# Patient Record
Sex: MALE | Race: BLACK OR AFRICAN AMERICAN | Employment: FULL TIME | URBAN - METROPOLITAN AREA
[De-identification: names, ages, dates, MRNs, and addresses within clinical notes are randomized per-mention and may not be internally consistent; named-entity substitution may affect disease eponyms.]

---

## 2021-10-25 ENCOUNTER — TELEPHONE (OUTPATIENT)
Dept: PHARMACY | Age: 61
End: 2021-10-25

## 2021-10-25 NOTE — TELEPHONE ENCOUNTER
Pt would like to get established w/ the [facility], was going to  CC. Will fax referral to Dr Soren Graff .

## 2021-10-26 NOTE — TELEPHONE ENCOUNTER
Received signed referral from Dr Barbara Marroquin. Tried calling pt to schedule initial appt , had to M.

## 2021-10-29 NOTE — TELEPHONE ENCOUNTER
Patient returned call. States that he has been on warfarin for ~1 year and was managed previously at [facility]. He believes last INR check was ~2 months ago. Scheduled initial appt in clinic for 11/1 at 7:30 am. Asked for patient to arrive 15 min early to register. Asked for pt to bring insurance, ID and med list to appt.

## 2021-11-01 ENCOUNTER — ANTI-COAG VISIT (OUTPATIENT)
Dept: PHARMACY | Age: 61
End: 2021-11-01
Payer: COMMERCIAL

## 2021-11-01 DIAGNOSIS — I63.9 ACUTE CEREBROVASCULAR ACCIDENT (HCC): Primary | ICD-10-CM

## 2021-11-01 LAB — INTERNATIONAL NORMALIZATION RATIO, POC: 1.8

## 2021-11-01 PROCEDURE — 99213 OFFICE O/P EST LOW 20 MIN: CPT

## 2021-11-01 PROCEDURE — 85610 PROTHROMBIN TIME: CPT

## 2021-11-01 RX ORDER — WARFARIN SODIUM 5 MG/1
5 TABLET ORAL DAILY
COMMUNITY
Start: 2021-10-19

## 2021-11-01 RX ORDER — SACUBITRIL AND VALSARTAN 24; 26 MG/1; MG/1
1 TABLET, FILM COATED ORAL
COMMUNITY
Start: 2021-08-19

## 2021-11-01 RX ORDER — ATORVASTATIN CALCIUM 80 MG/1
80 TABLET, FILM COATED ORAL DAILY
COMMUNITY
Start: 2020-12-17

## 2021-11-01 RX ORDER — WARFARIN SODIUM 2.5 MG/1
2.5 TABLET ORAL
COMMUNITY
Start: 2021-09-12

## 2021-11-01 RX ORDER — METOPROLOL SUCCINATE 25 MG/1
25 TABLET, EXTENDED RELEASE ORAL DAILY
COMMUNITY
Start: 2021-01-28

## 2021-11-01 RX ORDER — PREDNISONE 1 MG/1
10 TABLET ORAL DAILY
COMMUNITY
Start: 2021-03-01

## 2021-11-01 NOTE — PROGRESS NOTES
Mr. Kelsi Wallace is a 62 y.o. y/o male with history of CVA dt thrombus who presents today for anticoagulation monitoring and adjustment. 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 Was monitored at [facility] but dc dt non-compliance. Patient Reported Findings:  Yes     No  [x]   []       Patient verifies current dosing regimen as listed- pt has 5mg and 2.5mg tablets and takes 7.5mg (one of each) on Thursdays only and 5mg all other days  []   [x]       S/S bleeding/bruising/swelling/SOB  []   [x]       Blood in urine or stool  []   [x]       Procedures scheduled in the future at this time- had wisdom teeth procedure on 10/27 and held 2 days before and day of procedure, then took 7.5mg on Thurs and Sat and today he thinks   []   [x]       Missed Dose- held 2 days before procedure and day of  []   [x]       Extra Dose- took 7.5mg Sat and today   []   [x]       Change in medications- takes pred 10mg daily, taking ibuprofen since wisdom teeth removed, pt not sure of meds- will print off list and he will check and bring back at next visit. []   [x]       Change in health/diet/appetite- pt cut them out of diet but occasionally eats broccoli   []   [x]       Change in alcohol use- drinks wine occasionally, doesn't smoke   []   [x]       Change in activity  []   [x]       Hospital admission  []   [x]       Emergency department visit  []   [x]       Other complaints    Clinical Outcomes:  Yes     No  []   [x]       Major bleeding event  []   [x]       Thromboembolic event    Duration of warfarin Therapy: indefinite  INR Range:  2.0-3.0    Re-educated patient of signs and symptoms of bleeding and clotting, when to seek emergent care, the need to maintain a consistent diet and notification of clinic for any new medications including over the counter medications or abnormal bleeding.  Patient acknowledges working in consult agreement with pharmacist as referred by his/her physician. Pt has 5mg and 2.5mg tablets, takes at night. Pt states normally takes 7.5mg (one of each tablets) on Thursdays only and 5mg all other days but had wisdom teeth removed Wed and held 2 days before and day of then boosted Sat and was going to boost today as well. Per last  CC note pt was taking 7.5mg on Tues and Thurs and INR was 3.3 so dropped to 7.5mg on Thurs only (this was 8/10/21). INR is 1.8 today  Take 7.5mg tonight then continue taking dose of 7.5mg Thursdays only and 5mg all other days. Encouraged to maintain a consistency of vegetables/salads.    Recheck INR in 1 week, on 11/9  Pt would prefer 3:30/later apts dt work schedule     **consent form signed 11/1/2021    Referring Dr. Kristine Segovia  INR (no units)   Date Value   02/02/2021 3.6 (H)   01/27/2021 2.7 (H)   01/22/2021 1.7 (H)   01/19/2021 1.2       For Pharmacy Admin Tracking Only     Intervention Detail: Dose Adjustment: 1, reason: Therapy Optimization   Total # of Interventions Recommended: 1   Total # of Interventions Accepted: 1   Time Spent (min): 45

## 2021-11-09 ENCOUNTER — ANTI-COAG VISIT (OUTPATIENT)
Dept: PHARMACY | Age: 61
End: 2021-11-09
Payer: COMMERCIAL

## 2021-11-09 DIAGNOSIS — I63.9 ACUTE CEREBROVASCULAR ACCIDENT (HCC): Primary | ICD-10-CM

## 2021-11-09 LAB — INTERNATIONAL NORMALIZATION RATIO, POC: 1.8

## 2021-11-09 PROCEDURE — 99211 OFF/OP EST MAY X REQ PHY/QHP: CPT

## 2021-11-09 PROCEDURE — 85610 PROTHROMBIN TIME: CPT

## 2021-11-09 NOTE — PROGRESS NOTES
Mr. Omari Palacios is a 62 y.o. y/o male with history of CVA dt thrombus who presents today for anticoagulation monitoring and adjustment. 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 Was monitored at [facility] but dc dt non-compliance. Patient Reported Findings:  Yes     No  [x]   []       Patient verifies current dosing regimen as listed- pt has 5mg and 2.5mg tablets and takes 7.5mg (one of each) on Thursdays only and 5mg all other days --> verified dose   []   [x]       S/S bleeding/bruising/swelling/SOB  []   [x]       Blood in urine or stool  []   [x]       Procedures scheduled in the future at this time- had wisdom teeth procedure on 10/27 and held 2 days before and day of procedure, then took 7.5mg on Thurs and Sat and today he thinks   [x]   []       Missed Dose- Sunday   []   [x]       Extra Dose-    [x]   []       Change in medications- takes pred 10mg daily, taking ibuprofen since wisdom teeth removed, pt not sure of meds- will print off list and he will check and bring back at next visit. -->  Restarted metoprlol, had not been taking   [x]   []       Change in health/diet/appetite- pt cut them out of diet but occasionally eats broccoli --> had more green tea than normal   []   [x]       Change in alcohol use- drinks wine occasionally, doesn't smoke   []   [x]       Change in activity  []   [x]       Hospital admission  []   [x]       Emergency department visit  []   [x]       Other complaints    Clinical Outcomes:  Yes     No  []   [x]       Major bleeding event  []   [x]       Thromboembolic event    Duration of warfarin Therapy: indefinite  INR Range:  2.0-3.0    Pt has 5mg and 2.5mg tablets, takes at night. Pt states normally takes 7.5mg (one of each tablets) on Thursdays only and 5mg all other days but had wisdom teeth removed Wed and held 2 days before and day of then boosted Sat and was going to boost today as well.  Per last  CC note pt was taking 7.5mg on Tues and Thurs and INR was 3.3 so dropped to 7.5mg on Thurs only (this was 8/10/21). INR is 1.8 again today  Take 7.5mg tonight then continue taking dose of 7.5mg Thursdays only and 5mg all other days. Encouraged to maintain a consistency of vegetables/salads.    Recheck INR in 1 week, 11/16    Pt would prefer 3:30/later apts dt work schedule     **consent form signed 11/1/2021    Referring Dr. Suleman Neves  INR (no units)   Date Value   11/01/2021 1.8   02/02/2021 3.6 (H)   01/27/2021 2.7 (H)   01/22/2021 1.7 (H)   01/19/2021 1.2       For Pharmacy Admin Tracking Only     Intervention Detail: Dose Adjustment: 1, reason: Therapy Optimization   Total # of Interventions Recommended: 1   Total # of Interventions Accepted: 1   Time Spent (min): 15

## 2021-11-12 RX ORDER — DEXTROSE, SODIUM CHLORIDE, AND POTASSIUM CHLORIDE 5; .45; .15 G/100ML; G/100ML; G/100ML
INJECTION INTRAVENOUS
Status: DISPENSED
Start: 2021-11-12 | End: 2021-11-12

## 2021-11-16 ENCOUNTER — ANTI-COAG VISIT (OUTPATIENT)
Dept: PHARMACY | Age: 61
End: 2021-11-16
Payer: COMMERCIAL

## 2021-11-16 DIAGNOSIS — I63.9 ACUTE CEREBROVASCULAR ACCIDENT (HCC): Primary | ICD-10-CM

## 2021-11-16 LAB — INTERNATIONAL NORMALIZATION RATIO, POC: 1.7

## 2021-11-16 PROCEDURE — 85610 PROTHROMBIN TIME: CPT

## 2021-11-16 PROCEDURE — 99212 OFFICE O/P EST SF 10 MIN: CPT

## 2021-11-16 NOTE — PROGRESS NOTES
Mr. Miracle Galvan is a 62 y.o. y/o male with history of CVA dt thrombus who presents today for anticoagulation monitoring and adjustment. 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 Was monitored at [facility] but dc dt non-compliance. Patient Reported Findings:  Yes     No  [x]   []       Patient verifies current dosing regimen as listed- pt has 5mg and 2.5mg tablets and takes 7.5mg (one of each) on Thursdays only and 5mg all other days --> verified dose   []   [x]       S/S bleeding/bruising/swelling/SOB- denies  []   [x]       Blood in urine or stool- denies   []   [x]       Procedures scheduled in the future at this time- had wisdom teeth procedure on 10/27 and held 2 days before and day of procedure, then took 7.5mg on Thurs and Sat and today he thinks---> denies    []   [x]       Missed Dose- missed Sun and Mon  []   [x]       Extra Dose-    [x]   []       Change in medications- takes pred 10mg daily, taking ibuprofen since wisdom teeth removed, pt not sure of meds- will print off list and he will check and bring back at next visit. -->  Restarted metoprolol, had not been taking---> no changes    [x]   []       Change in health/diet/appetite- pt cut them out of diet but occasionally eats broccoli --> had more green tea than normal---> no changes, no NVD   []   [x]       Change in alcohol use- drinks wine occasionally, doesn't smoke   []   [x]       Change in activity  []   [x]       Hospital admission  []   [x]       Emergency department visit  []   [x]       Other complaints    Clinical Outcomes:  Yes     No  []   [x]       Major bleeding event  []   [x]       Thromboembolic event    Duration of warfarin Therapy: indefinite  INR Range:  2.0-3.0    Pt has 5mg and 2.5mg tablets, takes at night. Per last  CC note pt was taking 7.5mg on Tues and Thurs and INR was 3.3 so dropped to 7.5mg on Thurs only (this was 8/10/21).      INR is 1.7 today after missing the past 2 days. Explained need to use pillbox/AVS/alarm to remember taking dose as he consistently misses recently. Take 7.5mg tonight then continue taking dose of 7.5mg  only and 5mg all other days   Encouraged to maintain a consistency of vegetables/salads. RF of 5mg called into [facility].   Recheck INR in 1 week,     Pt would prefer 3:30/later apts dt work schedule   **consent form signed 2021    Referring Dr. Terell Ching  INR (no units)   Date Value   2021 1.7   2021 1.8   2021 1.8   2021 3.6 (H)   2021 2.7 (H)   2021 1.7 (H)   2021 1.2       For Pharmacy Admin Tracking Only     Intervention Detail: Adherence Monitorin, Dose Adjustment: 1, reason: Therapy Optimization and Refill(s) Provided   Total # of Interventions Recommended: 3   Total # of Interventions Accepted: 3   Time Spent (min): 15

## 2021-11-16 NOTE — TELEPHONE ENCOUNTER
Warfarin prescription phoned into [facility] [address] to [address] under [address]. Merlene Felder ([phone])  Warfarin 5 mg tabs  Take 5mg daily   90 days   2 refills    Martin Levy, JoonD, [facility]    **of note pt also has 2.5mg warfarin tables and total dose is: 7.5 mg (5 mg x 1 and 2.5 mg x 1) every Thu; 5 mg (5 mg x 1) all other days.

## 2021-11-23 ENCOUNTER — ANTI-COAG VISIT (OUTPATIENT)
Dept: PHARMACY | Age: 61
End: 2021-11-23
Payer: COMMERCIAL

## 2021-11-23 DIAGNOSIS — I63.9 ACUTE CEREBROVASCULAR ACCIDENT (HCC): Primary | ICD-10-CM

## 2021-11-23 LAB — INTERNATIONAL NORMALIZATION RATIO, POC: 1.8

## 2021-11-23 PROCEDURE — 99212 OFFICE O/P EST SF 10 MIN: CPT

## 2021-11-23 PROCEDURE — 85610 PROTHROMBIN TIME: CPT

## 2021-11-23 NOTE — PROGRESS NOTES
Mr. Radha Nieto is a 62 y.o. y/o male with history of CVA dt thrombus who presents today for anticoagulation monitoring and adjustment. 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 Was monitored at [facility] but dc dt non-compliance. Patient Reported Findings:  Yes     No  [x]   []       Patient verifies current dosing regimen as listed- pt has 5mg and 2.5mg tablets and takes 7.5mg (one of each) on Thursdays only and 5mg all other days --> verified dose   []   [x]       S/S bleeding/bruising/swelling/SOB- denies  []   [x]       Blood in urine or stool- denies   []   [x]       Procedures scheduled in the future at this time- had wisdom teeth procedure on 10/27 and held 2 days before and day of procedure, then took 7.5mg on Thurs and Sat and today he thinks---> denies    []   [x]       Missed Dose- denies   []   [x]       Extra Dose-    [x]   []       Change in medications- takes pred 10mg daily, taking ibuprofen since wisdom teeth removed, pt not sure of meds- will print off list and he will check and bring back at next visit. -->  Restarted metoprolol, had not been taking---> increasing entresto     []   [x]       Change in health/diet/appetite- pt cut them out of diet but occasionally eats broccoli --> had more green tea than normal---> no changes, no NVD, no vit k    []   [x]       Change in alcohol use- drinks wine occasionally, doesn't smoke   []   [x]       Change in activity  []   [x]       Hospital admission  []   [x]       Emergency department visit  []   [x]       Other complaints    Clinical Outcomes:  Yes     No  []   [x]       Major bleeding event  []   [x]       Thromboembolic event    Duration of warfarin Therapy: indefinite  INR Range:  2.0-3.0    Pt has 5mg and 2.5mg tablets, takes at night. Per last  CC note pt was taking 7.5mg on Tues and Thurs and INR was 3.3 so dropped to 7.5mg on Thurs only (this was 8/10/21).      INR is 1.8

## 2021-12-03 ENCOUNTER — ANTI-COAG VISIT (OUTPATIENT)
Dept: PHARMACY | Age: 61
End: 2021-12-03
Payer: COMMERCIAL

## 2021-12-03 DIAGNOSIS — I63.9 ACUTE CEREBROVASCULAR ACCIDENT (HCC): Primary | ICD-10-CM

## 2021-12-03 LAB — INTERNATIONAL NORMALIZATION RATIO, POC: 2.6

## 2021-12-03 PROCEDURE — 85610 PROTHROMBIN TIME: CPT

## 2021-12-03 PROCEDURE — 99211 OFF/OP EST MAY X REQ PHY/QHP: CPT

## 2021-12-03 NOTE — PROGRESS NOTES
Mr. Lorraine Dean is a 62 y.o. y/o male with history of CVA dt thrombus who presents today for anticoagulation monitoring and adjustment. 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 Was monitored at [facility] but dc dt non-compliance. Patient Reported Findings:   Yes     No  [x]   []       Patient verifies current dosing regimen as listed- pt has 5mg and 2.5mg tablets and takes 7.5mg (one of each) on Thursdays only and 5mg all other days --> verified dose   []   [x]       S/S bleeding/bruising/swelling/SOB- denies  []   [x]       Blood in urine or stool- denies   []   [x]       Procedures scheduled in the future at this time- had wisdom teeth procedure on 10/27 and held 2 days before and day of procedure, then took 7.5mg on Thurs and Sat and today he thinks---> denies     []   [x]       Missed Dose- denies --> missed 2.5 mg on last Tuesday (should be 7.5 mg but only took 5 mg) and took extra 2.5 mg the next day instead  []   [x]       Extra Dose  [x]   []       Change in medications- takes pred 10mg daily, taking ibuprofen since wisdom teeth removed, pt not sure of meds- will print off list and he will check and bring back at next visit.  -->  Restarted metoprolol, had not been taking---> increasing entresto --> MD inc med dose but unsure of name; asked pt to bring the med to next appt   []   [x]       Change in health/diet/appetite- pt cut them out of diet but occasionally eats broccoli --> had more green tea than normal---> no changes, no NVD, no vit k --> eating some string beans but no greens otherwise   []   [x]       Change in alcohol use- drinks wine occasionally, doesn't smoke   []   [x]       Change in activity  []   [x]       Hospital admission  []   [x]       Emergency department visit  []   [x]       Other complaints    Clinical Outcomes:  Yes     No  []   [x]       Major bleeding event  []   [x]       Thromboembolic event    Duration of warfarin Therapy: indefinite  INR Range:  2.0-3.0    Pt has 5mg and 2.5mg tablets, takes at night. Per last [facility] note pt was taking 7.5mg on Tues and Thurs and INR was 3.3 so dropped to 7.5mg on Thurs only (this was 8/10/21). INR is 2.6 Today after the dose increase last visit  Continue weekly dose to 7.5mg Tues, Thurs and Sat and 5mg all other days   Encouraged to maintain a consistency of vegetables/salads.    Recheck INR in 3 week, 12/23 d/t holiday hours     Pt would prefer 3:30/later apts dt work schedule   **consent form signed 11/1/2021    Referring Dr. Devon Alston  INR (no units)   Date Value   11/23/2021 1.8   11/16/2021 1.7   11/09/2021 1.8   11/01/2021 1.8   02/02/2021 3.6 (H)   01/27/2021 2.7 (H)   01/22/2021 1.7 (H)   01/19/2021 1.2       For Pharmacy Admin Tracking Only     Total # of Interventions Recommended: 0   Total # of Interventions Accepted: 0   Time Spent (min): 15

## 2021-12-23 ENCOUNTER — ANTI-COAG VISIT (OUTPATIENT)
Dept: PHARMACY | Age: 61
End: 2021-12-23
Payer: COMMERCIAL

## 2021-12-23 DIAGNOSIS — I63.9 ACUTE CEREBROVASCULAR ACCIDENT (HCC): Primary | ICD-10-CM

## 2021-12-23 LAB — INTERNATIONAL NORMALIZATION RATIO, POC: 2.6

## 2021-12-23 PROCEDURE — 99211 OFF/OP EST MAY X REQ PHY/QHP: CPT

## 2021-12-23 PROCEDURE — 85610 PROTHROMBIN TIME: CPT

## 2021-12-23 RX ORDER — SPIRONOLACTONE 25 MG/1
12.5 TABLET ORAL EVERY OTHER DAY
COMMUNITY

## 2021-12-23 NOTE — PROGRESS NOTES
Mr. Miracle Galvan is a 62 y.o. y/o male with history of CVA dt thrombus who presents today for anticoagulation monitoring and adjustment. 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 Was monitored at [facility] but dc dt non-compliance. Patient Reported Findings:   Yes     No  [x]   []       Patient verifies current dosing regimen as listed- pt has 5mg and 2.5mg tablets and takes 7.5mg (one of each) on Thursdays only and 5mg all other days --> verified dose   []   [x]       S/S bleeding/bruising/swelling/SOB- denies  []   [x]       Blood in urine or stool- denies   []   [x]       Procedures scheduled in the future at this time- had wisdom teeth procedure on 10/27 and held 2 days before and day of procedure, then took 7.5mg on Thurs and Sat and today he thinks---> denies     []   [x]       Missed Dose - Missed 12/22  []   [x]       Extra Dose - Denies  [x]   []       Change in medications- takes pred 10mg daily, taking ibuprofen since wisdom teeth removed, pt not sure of meds- will print off list and he will check and bring back at next visit.  -->  Restarted metoprolol, had not been taking---> increasing entresto --> MD inc med dose but unsure of name; asked pt to bring the med to next appt  --> spironolactone 12.5mg EOD  []   [x]       Change in health/diet/appetite- pt cut them out of diet but occasionally eats broccoli --> had more green tea than normal---> no changes, no NVD, no vit k --> eating some string beans but no greens otherwise --> no changes, no NVD   []   [x]       Change in alcohol use- drinks wine occasionally, doesn't smoke   []   [x]       Change in activity  []   [x]       Hospital admission  []   [x]       Emergency department visit  []   [x]       Other complaints    Clinical Outcomes:  Yes     No  []   [x]       Major bleeding event  []   [x]       Thromboembolic event    Duration of warfarin Therapy: indefinite  INR Range: 2.0-3.0    Pt has 5mg and 2.5mg tablets, takes at night. Per last [facility] note pt was taking 7.5mg on Tues and Thurs and INR was 3.3 so dropped to 7.5mg on Thurs only (this was 8/10/21). INR is 2.6  Continue weekly dose to 7.5mg Tues, Thurs and Sat and 5mg all other days   Encouraged to maintain a consistency of vegetables/salads.    Recheck INR in 4 week, 1/20     Pt would prefer 3:30/later apts dt work schedule   **consent form signed 11/1/2021    Referring Dr. Bonnie Tovar  INR (no units)   Date Value   12/23/2021 2.6   12/03/2021 2.6   11/23/2021 1.8   11/16/2021 1.7   02/02/2021 3.6 (H)   01/27/2021 2.7 (H)   01/22/2021 1.7 (H)   01/19/2021 1.2     For Pharmacy Admin Tracking Only     Total # of Interventions Recommended: 0   Total # of Interventions Accepted: 0   Time Spent (min): 15

## 2022-01-20 ENCOUNTER — ANTI-COAG VISIT (OUTPATIENT)
Dept: PHARMACY | Age: 62
End: 2022-01-20
Payer: COMMERCIAL

## 2022-01-20 DIAGNOSIS — I63.9 ACUTE CEREBROVASCULAR ACCIDENT (HCC): Primary | ICD-10-CM

## 2022-01-20 LAB — INTERNATIONAL NORMALIZATION RATIO, POC: 2.8

## 2022-01-20 PROCEDURE — 85610 PROTHROMBIN TIME: CPT

## 2022-01-20 PROCEDURE — 99212 OFFICE O/P EST SF 10 MIN: CPT

## 2022-01-20 NOTE — PROGRESS NOTES
Mr. Isak Lynn is a 62 y.o. y/o male with history of CVA dt thrombus who presents today for anticoagulation monitoring and adjustment. 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 Was monitored at [facility] but dc dt non-compliance. Patient Reported Findings:   Yes     No  [x]   []       Patient verifies current dosing regimen as listed- pt has 5mg and 2.5mg tablets and takes 7.5mg (one of each) on Thursdays only and 5mg all other days --> verified dose   []   [x]       S/S bleeding/bruising/swelling/SOB- denies  []   [x]       Blood in urine or stool- denies   []   [x]       Procedures scheduled in the future at this time- had wisdom teeth procedure on 10/27 and held 2 days before and day of procedure, then took 7.5mg on Thurs and Sat and today he thinks---> denies     [x]   []       Missed Dose - Missed last night and 2 nights ago   []   [x]       Extra Dose - Denies  [x]   []       Change in medications- takes pred 10mg daily, taking ibuprofen since wisdom teeth removed, pt not sure of meds- will print off list and he will check and bring back at next visit. -->  Restarted metoprolol, had not been taking---> increasing entresto --> MD inc med dose but unsure of name; asked pt to bring the med to next appt  --> spironolactone 12.5mg EOD --> had injection of steroid for sarcoidosis, will be having injections twice a week of steroids will be on for likely 1 year    [x]   []       Change in health/diet/appetite- pt cut them out of diet but occasionally eats broccoli --> had more green tea than normal---> no changes, no NVD, no vit k --> eating some string beans but no greens otherwise --> no changes, no NVD --> had been having diarrhea for 2 weeks, states that will happen on and off. Has resolved in the past week.  No changes in diet, still having small amount of green tea    []   [x]       Change in alcohol use- drinks wine occasionally, doesn't smoke []   [x]       Change in activity  []   [x]       Hospital admission  []   [x]       Emergency department visit  []   [x]       Other complaints    Clinical Outcomes:  Yes     No  []   [x]       Major bleeding event  []   [x]       Thromboembolic event    Duration of warfarin Therapy: indefinite  INR Range:  2.0-3.0    Pt has 5mg and 2.5mg tablets, takes at night. Per last [facility] note pt was taking 7.5mg on Tues and Thurs and INR was 3.3 so dropped to 7.5mg on Thurs only (this was 8/10/21). INR is 2.8 today after missing doses past 2 days, having diarrhea, but starting steroid injections    Decrease weekly dose to 5 mg daily (17% dec)  Encouraged to maintain a consistency of vegetables/salads.    Recheck INR in 1 week, 1/28     Pt would prefer 3:30/later apts dt work schedule   **consent form signed 11/1/2021    Referring Dr. Jean Logan  INR (no units)   Date Value   12/23/2021 2.6   12/03/2021 2.6   11/23/2021 1.8   11/16/2021 1.7   02/02/2021 3.6 (H)   01/27/2021 2.7 (H)   01/22/2021 1.7 (H)   01/19/2021 1.2       For Pharmacy Admin Tracking Only     Intervention Detail: Dose Adjustment: 1, reason: Therapy Optimization   Total # of Interventions Recommended: 1   Total # of Interventions Accepted: 1   Time Spent (min): 15

## 2022-01-28 ENCOUNTER — ANTI-COAG VISIT (OUTPATIENT)
Dept: PHARMACY | Age: 62
End: 2022-01-28
Payer: COMMERCIAL

## 2022-01-28 DIAGNOSIS — I63.9 ACUTE CEREBROVASCULAR ACCIDENT (HCC): Primary | ICD-10-CM

## 2022-01-28 LAB — INTERNATIONAL NORMALIZATION RATIO, POC: 2.7

## 2022-01-28 PROCEDURE — 85610 PROTHROMBIN TIME: CPT

## 2022-01-28 PROCEDURE — 99211 OFF/OP EST MAY X REQ PHY/QHP: CPT

## 2022-01-28 NOTE — PROGRESS NOTES
Mr. Yoly Mcgill is a 62 y.o. y/o male with history of CVA dt thrombus who presents today for anticoagulation monitoring and adjustment. 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 Was monitored at [facility] but dc dt non-compliance. Patient Reported Findings:   Yes     No  [x]   []       Patient verifies current dosing regimen as listed- pt has 5mg and 2.5mg tablets and takes 7.5mg (one of each) on Thursdays only and 5mg all other days --> verified dose   []   [x]       S/S bleeding/bruising/swelling/SOB- denies  []   [x]       Blood in urine or stool- denies   []   [x]       Procedures scheduled in the future at this time- had wisdom teeth procedure on 10/27 and held 2 days before and day of procedure, then took 7.5mg on Thurs and Sat and today he thinks---> denies     [x]   []       Missed Dose - Denies   []   [x]       Extra Dose - Denies  [x]   []       Change in medications- takes pred 10mg daily, taking ibuprofen since wisdom teeth removed, pt not sure of meds- will print off list and he will check and bring back at next visit. -->  Restarted metoprolol, had not been taking---> increasing entresto --> MD inc med dose but unsure of name; asked pt to bring the med to next appt  --> spironolactone 12.5mg EOD --> had injection of steroid for sarcoidosis, will be having injections twice a week of steroids will be on for likely 1 year --> no changes    [x]   []       Change in health/diet/appetite- pt cut them out of diet but occasionally eats broccoli --> had more green tea than normal---> no changes, no NVD, no vit k --> eating some string beans but no greens otherwise --> no changes, no NVD --> had been having diarrhea for 2 weeks, states that will happen on and off. Has resolved in the past week.  No changes in diet, still having small amount of green tea --> wants to add a day of greens in, no NVD  []   [x]       Change in alcohol use- drinks wine occasionally, doesn't smoke   []   [x]       Change in activity  []   [x]       Hospital admission  []   [x]       Emergency department visit  []   [x]       Other complaints    Clinical Outcomes:  Yes     No  []   [x]       Major bleeding event  []   [x]       Thromboembolic event    Duration of warfarin Therapy: indefinite  INR Range:  2.0-3.0    Pt has 5mg and 2.5mg tablets, takes at night. Per last [facility] note pt was taking 7.5mg on Tues and Thurs and INR was 3.3 so dropped to 7.5mg on Thurs only (this was 8/10/21). INR is 2.7 today after dose decrease last week as patient was starting steroid injections. Patient would like to add in a day of greens, okay as patient at the higher end of his range. Encouraged to maintain a consistency of vegetables/salads.    Recheck INR in 2 week, 2/11    Pt would prefer 3:30/later apts dt work schedule   **consent form signed 11/1/2021    Referring Dr. Bernardo Bowling  INR (no units)   Date Value   01/28/2022 2.7   01/20/2022 2.8   12/23/2021 2.6   12/03/2021 2.6   02/02/2021 3.6 (H)   01/27/2021 2.7 (H)   01/22/2021 1.7 (H)   01/19/2021 1.2   For Pharmacy Admin Tracking Only     Total # of Interventions Recommended: 0   Total # of Interventions Accepted: 0   Time Spent (min): 15

## 2022-02-11 ENCOUNTER — ANTI-COAG VISIT (OUTPATIENT)
Dept: PHARMACY | Age: 62
End: 2022-02-11
Payer: COMMERCIAL

## 2022-02-11 DIAGNOSIS — I63.9 ACUTE CEREBROVASCULAR ACCIDENT (HCC): Primary | ICD-10-CM

## 2022-02-11 LAB — INTERNATIONAL NORMALIZATION RATIO, POC: 1.3

## 2022-02-11 PROCEDURE — 85610 PROTHROMBIN TIME: CPT

## 2022-02-11 PROCEDURE — 99211 OFF/OP EST MAY X REQ PHY/QHP: CPT

## 2022-02-11 NOTE — PROGRESS NOTES
Mr. Sania Xiao is a 62 y.o. y/o male with history of CVA dt thrombus who presents today for anticoagulation monitoring and adjustment. 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 Was monitored at [facility] but dc dt non-compliance. Patient Reported Findings:   Yes     No  [x]   []       Patient verifies current dosing regimen as listed- pt has 5mg and 2.5mg tablets and takes 7.5mg (one of each) on Thursdays only and 5mg all other days --> verified dose   []   [x]       S/S bleeding/bruising/swelling/SOB- denies  []   [x]       Blood in urine or stool- denies   []   [x]       Procedures scheduled in the future at this time- had wisdom teeth procedure on 10/27 and held 2 days before and day of procedure, then took 7.5mg on Thurs and Sat and today he thinks---> denies     [x]   []       Missed Dose -  2/7 and 2/9  []   [x]       Extra Dose - Denies  [x]   []       Change in medications- takes pred 10mg daily, taking ibuprofen since wisdom teeth removed, pt not sure of meds- will print off list and he will check and bring back at next visit. -->  Restarted metoprolol, had not been taking---> increasing entresto --> MD inc med dose but unsure of name; asked pt to bring the med to next appt  --> spironolactone 12.5mg EOD --> had injection of steroid for sarcoidosis, will be having injections twice a week of steroids will be on for likely 1 year --> no changes --> 2-3 servings greens (summer greens), no NVD   [x]   []       Change in health/diet/appetite- pt cut them out of diet but occasionally eats broccoli --> had more green tea than normal---> no changes, no NVD, no vit k --> eating some string beans but no greens otherwise --> no changes, no NVD --> had been having diarrhea for 2 weeks, states that will happen on and off. Has resolved in the past week.  No changes in diet, still having small amount of green tea --> wants to add a day of greens in, no NVD  []   [x]       Change in alcohol use- drinks wine occasionally, doesn't smoke   []   [x]       Change in activity  []   [x]       Hospital admission  []   [x]       Emergency department visit  []   [x]       Other complaints    Clinical Outcomes:  Yes     No  []   [x]       Major bleeding event  []   [x]       Thromboembolic event    Duration of warfarin Therapy: indefinite  INR Range:  2.0-3.0    Pt has 5mg and 2.5mg tablets, takes at night. Per last [facility] note pt was taking 7.5mg on Tues and Thurs and INR was 3.3 so dropped to 7.5mg on Thurs only (this was 8/10/21). INR is 1.3 after two missed doses and adding greens in over the last two weeks. Would like to see INR with no missed doses prior to increasing maintenance dose. Take 7.5mg tonight then continue to take 5mg daily   Encouraged to maintain a consistency of vegetables/salads.    Recheck INR in 2 week, 2/25    Pt would prefer 3:30/later apts dt work schedule   **consent form signed 11/1/2021    Referring Dr. Nael Sweet  INR (no units)   Date Value   01/28/2022 2.7   01/20/2022 2.8   12/23/2021 2.6   12/03/2021 2.6   02/02/2021 3.6 (H)   01/27/2021 2.7 (H)   01/22/2021 1.7 (H)   01/19/2021 1.2   For Pharmacy Admin Tracking Only  Intervention Detail: Dose Adjustment: 1, reason: Therapy Optimization  Total # of Interventions Recommended: 1  Total # of Interventions Accepted: 1  Time Spent (min): 15

## 2022-02-25 ENCOUNTER — ANTI-COAG VISIT (OUTPATIENT)
Dept: PHARMACY | Age: 62
End: 2022-02-25
Payer: COMMERCIAL

## 2022-02-25 DIAGNOSIS — I63.9 ACUTE CEREBROVASCULAR ACCIDENT (HCC): Primary | ICD-10-CM

## 2022-02-25 LAB — INTERNATIONAL NORMALIZATION RATIO, POC: 2.2

## 2022-02-25 PROCEDURE — 99211 OFF/OP EST MAY X REQ PHY/QHP: CPT

## 2022-02-25 PROCEDURE — 85610 PROTHROMBIN TIME: CPT

## 2022-02-25 NOTE — PROGRESS NOTES
Mr. Liam Chen is a 62 y.o. y/o male with history of CVA dt thrombus who presents today for anticoagulation monitoring and adjustment. 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 Was monitored at [facility] but dc dt non-compliance. Patient Reported Findings:   Yes     No  [x]   []       Patient verifies current dosing regimen as listed- pt has 5mg and 2.5mg tablets and takes 7.5mg (one of each) on Thursdays only and 5mg all other days --> verified dose   []   [x]       S/S bleeding/bruising/swelling/SOB- denies  []   [x]       Blood in urine or stool- denies   []   [x]       Procedures scheduled in the future at this time- had wisdom teeth procedure on 10/27 and held 2 days before and day of procedure, then took 7.5mg on Thurs and Sat and today he thinks---> denies     [x]   []       Missed Dose -  2/7 and 2/9  []   [x]       Extra Dose - Denies  [x]   []       Change in medications- takes pred 10mg daily, taking ibuprofen since wisdom teeth removed, pt not sure of meds- will print off list and he will check and bring back at next visit. -->  Restarted metoprolol, had not been taking---> increasing entresto --> MD inc med dose but unsure of name; asked pt to bring the med to next appt  --> spironolactone 12.5mg EOD --> had injection of steroid for sarcoidosis, will be having injections twice a week of steroids will be on for likely 1 year --> no changes --> 2-3 servings greens (summer greens), no NVD --> no greens, no NVD  [x]   []       Change in health/diet/appetite- pt cut them out of diet but occasionally eats broccoli --> had more green tea than normal---> no changes, no NVD, no vit k --> eating some string beans but no greens otherwise --> no changes, no NVD --> had been having diarrhea for 2 weeks, states that will happen on and off. Has resolved in the past week.  No changes in diet, still having small amount of green tea --> wants to add a day of greens in, no NVD  []   [x]       Change in alcohol use- drinks wine occasionally, doesn't smoke   []   [x]       Change in activity  []   [x]       Hospital admission  []   [x]       Emergency department visit  []   [x]       Other complaints    Clinical Outcomes:  Yes     No  []   [x]       Major bleeding event  []   [x]       Thromboembolic event    Duration of warfarin Therapy: indefinite  INR Range:  2.0-3.0    Pt has 5mg and 2.5mg tablets, takes at night. Per last [facility] note pt was taking 7.5mg on Tues and Thurs and INR was 3.3 so dropped to 7.5mg on Thurs only (this was 8/10/21). INR is 2.2 after no missed doses / no greens. Patient plans to continue with no greens for now. Will maintain current dose. Continue to take 5mg daily    Encouraged to maintain a consistency of vegetables/salads.    Recheck INR in 3 week, 3/18    Pt would prefer 3:30/later apts dt work schedule   **consent form signed 11/1/2021    Referring Dr. Moe Xiao  INR (no units)   Date Value   02/11/2022 1.3   01/28/2022 2.7   01/20/2022 2.8   12/23/2021 2.6   02/02/2021 3.6 (H)   01/27/2021 2.7 (H)   01/22/2021 1.7 (H)   01/19/2021 1.2   For Pharmacy Admin Tracking Only  Intervention Detail: Refill(s) Provided  Total # of Interventions Recommended: 1  Total # of Interventions Accepted: 1  Time Spent (min): 15

## 2022-03-18 ENCOUNTER — ANTI-COAG VISIT (OUTPATIENT)
Dept: PHARMACY | Age: 62
End: 2022-03-18
Payer: COMMERCIAL

## 2022-03-18 DIAGNOSIS — I63.9 ACUTE CEREBROVASCULAR ACCIDENT (HCC): Primary | ICD-10-CM

## 2022-03-18 LAB — INTERNATIONAL NORMALIZATION RATIO, POC: 1.5

## 2022-03-18 PROCEDURE — 85610 PROTHROMBIN TIME: CPT

## 2022-03-18 PROCEDURE — 99212 OFFICE O/P EST SF 10 MIN: CPT

## 2022-03-18 NOTE — PROGRESS NOTES
Major bleeding event  []   [x]       Thromboembolic event    Duration of warfarin Therapy: indefinite  INR Range:  2.0-3.0    Pt has 5mg and 2.5mg tablets, takes at night. Per last [facility] note pt was taking 7.5mg on Tues and Thurs and INR was 3.3 so dropped to 7.5mg on Thurs only (this was 8/10/21). INR is 1.5 after missing dose, but also increasing vit k intake   Increase weekly dose to 7.5 mg on Fri and 5 mg all other days of the week (7% inc)  Encouraged to maintain a consistency of vegetables/salads.    Recheck INR in 2 weeks, 4/1    Pt would prefer 3:30/later apts dt work schedule   **consent form signed 11/1/2021    Referring Dr. Norma Cristobal  INR (no units)   Date Value   02/25/2022 2.2   02/11/2022 1.3   01/28/2022 2.7   01/20/2022 2.8   02/02/2021 3.6 (H)   01/27/2021 2.7 (H)   01/22/2021 1.7 (H)   01/19/2021 1.2         For Pharmacy Admin Tracking Only     Intervention Detail: Dose Adjustment: 1, reason: Therapy Optimization   Total # of Interventions Recommended: 1   Total # of Interventions Accepted: 1   Time Spent (min): 15

## 2022-04-04 ENCOUNTER — TELEPHONE (OUTPATIENT)
Dept: PHARMACY | Age: 62
End: 2022-04-04

## 2022-04-05 ENCOUNTER — ANTI-COAG VISIT (OUTPATIENT)
Dept: PHARMACY | Age: 62
End: 2022-04-05
Payer: COMMERCIAL

## 2022-04-05 DIAGNOSIS — I63.9 ACUTE CEREBROVASCULAR ACCIDENT (HCC): Primary | ICD-10-CM

## 2022-04-05 LAB — INTERNATIONAL NORMALIZATION RATIO, POC: 2.4

## 2022-04-05 PROCEDURE — 85610 PROTHROMBIN TIME: CPT

## 2022-04-05 PROCEDURE — 99212 OFFICE O/P EST SF 10 MIN: CPT

## 2022-04-05 NOTE — PROGRESS NOTES
Mr. Kishor Rowan is a 62 y.o. y/o male with history of CVA dt thrombus who presents today for anticoagulation monitoring and adjustment. 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 Was monitored at [facility] but dc dt non-compliance. Patient Reported Findings:   Yes     No  [x]   []       Patient verifies current dosing regimen as listed- pt has 5mg and 2.5mg tablets and takes 7.5mg (one of each) on Thursdays only and 5mg all other days --> verified dose ---> taking 5mg daily per pt   []   [x]       S/S bleeding/bruising/swelling/SOB- denies  []   [x]       Blood in urine or stool- denies   []   [x]       Procedures scheduled in the future at this time- had wisdom teeth procedure on 10/27 and held 2 days before and day of procedure, then took 7.5mg on Thurs and Sat and today he thinks---> denies     [x]   []       Missed Dose -  Missed one dose 2 weeks ago   [x]   []       Extra Dose - denies   []   [x]       Change in medications- takes pred 10mg daily, --> had injection of steroid for sarcoidosis, will be having injections twice a week of steroids will be on for likely 1 year --> no changes   [x]   []       Change in health/diet/appetite- pt cut them out of diet but occasionally eats broccoli --> had more green tea than normal---> no changes, no NVD, no vit k --> eating some string beans but no greens otherwise --> no changes, no NVD --> had been having diarrhea for 2 weeks, states that will happen on and off. Has resolved in the past week.  No changes in diet, still having small amount of green tea --> wants to add a day of greens in, no NVD --> has been eating a small amount of vit k--->no changes, not many greens, no NVD   []   [x]       Change in alcohol use- drinks wine occasionally, doesn't smoke   []   [x]       Change in activity  []   [x]       Hospital admission  []   [x]       Emergency department visit  []   [x]       Other

## 2022-04-19 ENCOUNTER — ANTI-COAG VISIT (OUTPATIENT)
Dept: PHARMACY | Age: 62
End: 2022-04-19
Payer: COMMERCIAL

## 2022-04-19 DIAGNOSIS — I63.9 ACUTE CEREBROVASCULAR ACCIDENT (HCC): Primary | ICD-10-CM

## 2022-04-19 LAB — INTERNATIONAL NORMALIZATION RATIO, POC: 2.6

## 2022-04-19 PROCEDURE — 85610 PROTHROMBIN TIME: CPT

## 2022-04-19 PROCEDURE — 99211 OFF/OP EST MAY X REQ PHY/QHP: CPT

## 2022-04-19 NOTE — PROGRESS NOTES
Mr. Deb Urban is a 62 y.o. y/o male with history of CVA dt thrombus who presents today for anticoagulation monitoring and adjustment. 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 Was monitored at [facility] but dc dt non-compliance. Patient Reported Findings:   Yes     No  [x]   []       Patient verifies current dosing regimen as listed- pt has 5mg and 2.5mg tablets and takes 7.5mg (one of each) on Thursdays only and 5mg all other days --> verified dose ---> taking 5mg daily per pt   []   [x]       S/S bleeding/bruising/swelling/SOB- denies  []   [x]       Blood in urine or stool- denies   []   [x]       Procedures scheduled in the future at this time- had wisdom teeth procedure on 10/27 and held 2 days before and day of procedure, then took 7.5mg on Thurs and Sat and today he thinks---> denies     [x]   []       Missed Dose -  Denies   [x]   []       Extra Dose - denies   []   [x]       Change in medications- takes pred 10mg daily, --> had injection of steroid for sarcoidosis, will be having injections twice a week of steroids will be on for likely 1 year --> no changes   [x]   []       Change in health/diet/appetite- pt cut them out of diet but occasionally eats broccoli --> had more green tea than normal---> no changes, no NVD, no vit k --> eating some string beans but no greens otherwise --> no changes, no NVD --> had been having diarrhea for 2 weeks, states that will happen on and off. Has resolved in the past week.  No changes in diet, still having small amount of green tea --> wants to add a day of greens in, no NVD --> has been eating a small amount of vit k--->no changes, not many greens, no NVD---> no changes    []   [x]       Change in alcohol use- drinks wine occasionally, doesn't smoke   []   [x]       Change in activity  []   [x]       Hospital admission  []   [x]       Emergency department visit  []   [x]       Other complaints    Clinical Outcomes:  Yes     No  []   [x]       Major bleeding event  []   [x]       Thromboembolic event    Duration of warfarin Therapy: indefinite  INR Range:  2.0-3.0    Pt has 5mg and 2.5mg tablets, takes at night. INR is 2.6 today   Take 5mg daily. Encouraged to maintain a consistency of vegetables/salads.    Recheck INR in 3 weeks, 5/10    Pt would prefer 3:30/later apts dt work schedule   **consent form signed 11/1/2021    Referring Dr. Fernandez Bella  INR (no units)   Date Value   04/19/2022 2.6   04/05/2022 2.4   03/18/2022 1.5   02/25/2022 2.2   02/02/2021 3.6 (H)   01/27/2021 2.7 (H)   01/22/2021 1.7 (H)   01/19/2021 1.2       For Pharmacy Admin Tracking Only     Total # of Interventions Recommended: 0   Total # of Interventions Accepted: 0   Time Spent (min): 15

## 2022-05-10 ENCOUNTER — ANTI-COAG VISIT (OUTPATIENT)
Dept: PHARMACY | Age: 62
End: 2022-05-10
Payer: COMMERCIAL

## 2022-05-10 DIAGNOSIS — I63.9 ACUTE CEREBROVASCULAR ACCIDENT (HCC): Primary | ICD-10-CM

## 2022-05-10 LAB — INTERNATIONAL NORMALIZATION RATIO, POC: 1.4

## 2022-05-10 PROCEDURE — 85610 PROTHROMBIN TIME: CPT

## 2022-05-10 PROCEDURE — 99212 OFFICE O/P EST SF 10 MIN: CPT

## 2022-05-10 NOTE — PROGRESS NOTES
Mr. Beau Quiroga is a 62 y.o. y/o male with history of CVA dt thrombus who presents today for anticoagulation monitoring and adjustment. 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 Was monitored at [facility] but dc dt non-compliance. Patient Reported Findings:   Yes     No  [x]   []       Patient verifies current dosing regimen as listed- pt has 5mg and 2.5mg tablets and takes 7.5mg (one of each) on Thursdays only and 5mg all other days --> verified dose ---> taking 5mg daily per pt   []   [x]       S/S bleeding/bruising/swelling/SOB- denies  []   [x]       Blood in urine or stool- denies   []   [x]       Procedures scheduled in the future at this time- had wisdom teeth procedure on 10/27 and held 2 days before and day of procedure, then took 7.5mg on Thurs and Sat and today he thinks---> denies     [x]   []       Missed Dose -  Thurs   [x]   []       Extra Dose - denies   []   [x]       Change in medications- takes pred 10mg daily, --> had injection of steroid for sarcoidosis, will be having injections twice a week of steroids will be on for likely 1 year --> no changes   [x]   []       Change in health/diet/appetite- pt cut them out of diet but occasionally eats broccoli --> had more green tea than normal---> no changes, no NVD, no vit k --> eating some string beans but no greens otherwise --> no changes, no NVD --> had been having diarrhea for 2 weeks, states that will happen on and off. Has resolved in the past week.  No changes in diet, still having small amount of green tea --> wants to add a day of greens in, no NVD --> has been eating a small amount of vit k--->no changes, not many greens, no NVD---> no changes---> more greens     []   [x]       Change in alcohol use- drinks wine occasionally, doesn't smoke   []   [x]       Change in activity  []   [x]       Hospital admission  []   [x]       Emergency department visit  []   [x]       Other complaints    Clinical Outcomes:  Yes     No  []   [x]       Major bleeding event  []   [x]       Thromboembolic event    Duration of warfarin Therapy: indefinite  INR Range:  2.0-3.0    Pt has 5mg and 2.5mg tablets, takes at night. INR is 1.4 today dt missed dose Thursday and more greens. Take 7.5mg for 2 days then continue taking 5mg daily   Encouraged to maintain a consistency of vegetables/salads.    Recheck INR in 2.5 weeks,     Pt would prefer 3:30/later apts dt work schedule   **consent form signed 2021    Referring Dr. Raphael Daniel  INR (no units)   Date Value   05/10/2022 1.4   2022 2.6   2022 2.4   2022 1.5   2021 3.6 (H)   2021 2.7 (H)   2021 1.7 (H)   2021 1.2     For Pharmacy Admin Tracking Only     Intervention Detail: Adherence Monitorin and Dose Adjustment: 1, reason: Therapy Optimization   Total # of Interventions Recommended: 2   Total # of Interventions Accepted: 2   Time Spent (min): 15

## 2022-06-02 ENCOUNTER — ANTI-COAG VISIT (OUTPATIENT)
Dept: PHARMACY | Age: 62
End: 2022-06-02
Payer: COMMERCIAL

## 2022-06-02 DIAGNOSIS — I63.9 ACUTE CEREBROVASCULAR ACCIDENT (HCC): Primary | ICD-10-CM

## 2022-06-02 LAB — INTERNATIONAL NORMALIZATION RATIO, POC: 1.2

## 2022-06-02 PROCEDURE — 99212 OFFICE O/P EST SF 10 MIN: CPT

## 2022-06-02 PROCEDURE — 85610 PROTHROMBIN TIME: CPT

## 2022-06-02 NOTE — PROGRESS NOTES
Mr. Carrie Chacon is a 62 y.o. y/o male with history of CVA dt thrombus who presents today for anticoagulation monitoring and adjustment. 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 Was monitored at [facility] but dc dt non-compliance. Patient Reported Findings:   Yes     No  [x]   []       Patient verifies current dosing regimen as listed- pt has 5mg and 2.5mg tablets and takes 7.5mg (one of each) on Thursdays only and 5mg all other days --> verified dose ---> taking 5mg daily per pt   []   [x]       S/S bleeding/bruising/swelling/SOB- denies  []   [x]       Blood in urine or stool- denies   []   [x]       Procedures scheduled in the future at this time- had wisdom teeth procedure on 10/27 and held 2 days before and day of procedure, then took 7.5mg on Thurs and Sat and today he thinks---> denies     [x]   []       Missed Dose -  2-3 days ago    []   [x]       Extra Dose - denies   []   [x]       Change in medications- takes pred 10mg daily, --> had injection of steroid for sarcoidosis, will be having injections twice a week of steroids will be on for likely 1 year --> no changes   [x]   []       Change in health/diet/appetite- pt cut them out of diet but occasionally eats broccoli --> had more green tea than normal---> no changes, no NVD, no vit k --> eating some string beans but no greens otherwise --> no changes, no NVD --> had been having diarrhea for 2 weeks, states that will happen on and off. Has resolved in the past week.  No changes in diet, still having small amount of green tea --> wants to add a day of greens in, no NVD --> has been eating a small amount of vit k--->no changes, not many greens, no NVD---> no changes---> more greens --> had greens 3x/week and green tea      []   [x]       Change in alcohol use- drinks wine occasionally, doesn't smoke   []   [x]       Change in activity  []   [x]       Hospital admission  []   [x] Emergency department visit  []   [x]       Other complaints    Clinical Outcomes:  Yes     No  []   [x]       Major bleeding event  []   [x]       Thromboembolic event    Duration of warfarin Therapy: indefinite  INR Range:  2.0-3.0    Pt has 5mg and 2.5mg tablets, takes at night. INR is 1.2 today dt missed dose a few days ago and more greens again. Discussed importance of consistency with vit k intake   Increase weekly dose to 7.5 mg on Mon and Fri and 5 mg all other days of the week (14% inc)  Encouraged to maintain a consistency of vegetables/salads.    Recheck INR in 2 weeks,     Pt would prefer 3:30/later apts dt work schedule   **consent form signed 2021    Referring Dr. Basil Sanford  INR (no units)   Date Value   05/10/2022 1.4   2022 2.6   2022 2.4   2022 1.5   2021 3.6 (H)   2021 2.7 (H)   2021 1.7 (H)   2021 1.2     For Pharmacy Admin Tracking Only     Intervention Detail: Adherence Monitorin and Dose Adjustment: 1, reason: Therapy Optimization   Total # of Interventions Recommended: 2   Total # of Interventions Accepted: 2   Time Spent (min): 15

## 2022-06-16 ENCOUNTER — ANTI-COAG VISIT (OUTPATIENT)
Dept: PHARMACY | Age: 62
End: 2022-06-16
Payer: COMMERCIAL

## 2022-06-16 DIAGNOSIS — I63.9 ACUTE CEREBROVASCULAR ACCIDENT (HCC): Primary | ICD-10-CM

## 2022-06-16 LAB — INTERNATIONAL NORMALIZATION RATIO, POC: 1.7

## 2022-06-16 PROCEDURE — 85610 PROTHROMBIN TIME: CPT

## 2022-06-16 PROCEDURE — 99212 OFFICE O/P EST SF 10 MIN: CPT

## 2022-06-16 NOTE — PROGRESS NOTES
Emergency department visit  []   [x]       Other complaints    Clinical Outcomes:  Yes     No  []   [x]       Major bleeding event  []   [x]       Thromboembolic event    Duration of warfarin Therapy: indefinite  INR Range:  2.0-3.0    Pt has 5mg and 2.5mg tablets, takes at night. INR is 1.7 today dt missed dose a few days ago  Discussed importance of consistency with vit k intake and ways to help ensure pt takes doses since consistently missing doses. Pt states there is no problem   Take 7.5 mg tonight then continue weekly dose of 7.5 mg on Mon and Fri and 5 mg all other days of the week   Encouraged to maintain a consistency of vegetables/salads.    Recheck INR in 2 weeks,     Pt would prefer 3:30/later apts dt work schedule   **consent form signed 2021    Referring Dr. Breanna Gutiérrez  INR (no units)   Date Value   2021 3.6 (H)   2021 2.7 (H)   2021 1.7 (H)   2021 1.2     INR,(POC) (no units)   Date Value   2022 1.2   05/10/2022 1.4   2022 2.6   2022 2.4     For Pharmacy Admin Tracking Only     Intervention Detail: Adherence Monitorin and Dose Adjustment: 1, reason: Therapy Optimization   Total # of Interventions Recommended: 2   Total # of Interventions Accepted: 2   Time Spent (min): 15

## 2022-06-30 ENCOUNTER — ANTI-COAG VISIT (OUTPATIENT)
Dept: PHARMACY | Age: 62
End: 2022-06-30
Payer: COMMERCIAL

## 2022-06-30 DIAGNOSIS — I63.9 ACUTE CEREBROVASCULAR ACCIDENT (HCC): Primary | ICD-10-CM

## 2022-06-30 LAB — INTERNATIONAL NORMALIZATION RATIO, POC: 1.6

## 2022-06-30 PROCEDURE — 85610 PROTHROMBIN TIME: CPT

## 2022-06-30 PROCEDURE — 99212 OFFICE O/P EST SF 10 MIN: CPT

## 2022-06-30 NOTE — PROGRESS NOTES
Mr. Radha Mo is a 62 y.o. y/o male with history of CVA dt thrombus who presents today for anticoagulation monitoring and adjustment. 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 Was monitored at [facility] but dc dt non-compliance. Patient Reported Findings:   Yes     No  [x]   []       Patient verifies current dosing regimen as listed- pt has 5mg and 2.5mg tablets and takes 7.5mg (one of each) on Thursdays only and 5mg all other days --> verified dose ---> taking 5mg daily per pt --> verified dose   []   [x]       S/S bleeding/bruising/swelling/SOB- denies  []   [x]       Blood in urine or stool- denies   []   [x]       Procedures scheduled in the future at this time- had wisdom teeth procedure on 10/27 and held 2 days before and day of procedure, then took 7.5mg on Thurs and Sat and today he thinks---> denies     []   [x]       Missed Dose -  2-3 days ago -->believes missed dose a few days ago --> denies      []   [x]       Extra Dose - denies   []   [x]       Change in medications- takes pred 10mg daily, --> had injection of steroid for sarcoidosis, will be having injections twice a week of steroids will be on for likely 1 year --> no changes   [x]   []       Change in health/diet/appetite- pt cut them out of diet but occasionally eats broccoli --> had more green tea than normal --> eating some string beans but no greens otherwise --> no changes, no NVD --> had been having diarrhea for 2 weeks, states that will happen on and off. Has resolved in the past week.  No changes in diet, still having small amount of green tea --> wants to add a day of greens in, no NVD --> more greens --> had greens 3x/week and green tea --> no vit k in the past week, one green tea --> had greens 2-3 times in past week       []   [x]       Change in alcohol use- drinks wine occasionally, doesn't smoke   []   [x]       Change in activity  []   [x]       Hospital admission  []   [x]       Emergency department visit  []   [x]       Other complaints    Clinical Outcomes:  Yes     No  []   [x]       Major bleeding event  []   [x]       Thromboembolic event    Duration of warfarin Therapy: indefinite  INR Range:  2.0-3.0    Pt has 5mg and 2.5mg tablets, takes at night. INR is 1.6 today dt extra greens again   Discussed importance of consistency with vit k intake and ways to help ensure pt takes doses since consistently missing doses  Take 7.5 mg tonight then increase weekly dose to 7.5 mg on  and Fri and 5 mg all other days of the week (6% inc)  Encouraged to maintain a consistency of vegetables/salads.    Recheck INR in 10 days,     Pt would prefer 3:30/later apts dt work schedule   **consent form signed 2021    Referring Dr. Fouzia Neri  INR (no units)   Date Value   2021 3.6 (H)   2021 2.7 (H)   2021 1.7 (H)   2021 1.2     INR,(POC) (no units)   Date Value   2022 1.7   2022 1.2   05/10/2022 1.4   2022 2.6     For Pharmacy Admin Tracking Only     Intervention Detail: Adherence Monitorin and Dose Adjustment: 1, reason: Therapy Optimization   Total # of Interventions Recommended: 2   Total # of Interventions Accepted: 2   Time Spent (min): 15

## 2022-07-13 ENCOUNTER — TELEPHONE (OUTPATIENT)
Dept: PHARMACY | Age: 62
End: 2022-07-13

## 2022-07-18 ENCOUNTER — ANTI-COAG VISIT (OUTPATIENT)
Dept: PHARMACY | Age: 62
End: 2022-07-18
Payer: COMMERCIAL

## 2022-07-18 DIAGNOSIS — I63.9 ACUTE CEREBROVASCULAR ACCIDENT (HCC): Primary | ICD-10-CM

## 2022-07-18 LAB — INTERNATIONAL NORMALIZATION RATIO, POC: 1.7

## 2022-07-18 PROCEDURE — 85610 PROTHROMBIN TIME: CPT

## 2022-07-18 PROCEDURE — 99212 OFFICE O/P EST SF 10 MIN: CPT

## 2022-07-18 NOTE — PROGRESS NOTES
Mr. America Nielsen is a 62 y.o. y/o male with history of  CVA dt thrombus  who presents today for anticoagulation monitoring and adjustment. 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 Was monitored at [facility] but dc dt non-compliance. Patient Reported Findings:   Yes     No  []   [x]       Patient verifies current dosing regimen as listed- pt has 5mg and 2.5mg tablets and takes 7.5mg (one of each) on Thursdays only and 5mg all other days --> verified dose ---> taking 5mg daily per pt --> patient has struggle with motivation and keeping up on taking medication. []   [x]       S/S bleeding/bruising/swelling/SOB- denies  []   [x]       Blood in urine or stool- denies   []   [x]       Procedures scheduled in the future at this time- had wisdom teeth procedure on 10/27 and held 2 days before and day of procedure, then took 7.5mg on Thurs and Sat and today he thinks---> denies     [x]   []       Missed Dose -  2-3 days ago -->believes missed dose a few days ago --> Friday 7/15  []   [x]       Extra Dose - denies   []   [x]       Change in medications- takes pred 10mg daily, --> had injection of steroid for sarcoidosis, will be having injections twice a week of steroids will be on for likely 1 year --> no changes   [x]   []       Change in health/diet/appetite- pt cut them out of diet but occasionally eats broccoli --> had more green tea than normal --> eating some string beans but no greens otherwise --> no changes, no NVD --> had been having diarrhea for 2 weeks, states that will happen on and off. Has resolved in the past week.  No changes in diet, still having small amount of green tea --> wants to add a day of greens in, no NVD --> more greens --> had greens 3x/week and green tea --> no vit k in the past week, one green tea --> had greens 2-3 times in past week --> no greens      []   [x]       Change in alcohol use- drinks wine occasionally, doesn't smoke   []   [x]       Change in activity  []   [x]       Hospital admission  []   [x]       Emergency department visit  []   [x]       Other complaints    Clinical Outcomes:  Yes     No  []   [x]       Major bleeding event  []   [x]       Thromboembolic event    Duration of warfarin Therapy: indefinite  INR Range:  2.0-3.0    Pt has 5mg and 2.5mg tablets, takes at night. INR is 1.7 today (). Patient claims only missed dose on 7/15. Increased regimen to 7.5 mg /// and 5 mg on // (6% inc)    Discussed importance of consistency with vit k intake and ways to help ensure pt takes doses since consistently missing doses    Encouraged to maintain a consistency of vegetables/salads.    Recheck INR in 14 days,     Pt would prefer 3:30/later apts dt work schedule   **consent form signed 2021    Referring Dr. Jennifer Coronado  INR (no units)   Date Value   2021 3.6 (H)   2021 2.7 (H)   2021 1.7 (H)   2021 1.2     INR,(POC) (no units)   Date Value   2022 1.6   2022 1.7   2022 1.2   05/10/2022 1.4     For Pharmacy Admin Tracking Only    Intervention Detail: Adherence Monitorin and Dose Adjustment: 1, reason: Therapy Optimization  Total # of Interventions Recommended: 1  Total # of Interventions Accepted: 1  Time Spent (min): 15

## 2022-08-08 ENCOUNTER — TELEPHONE (OUTPATIENT)
Dept: PHARMACY | Age: 62
End: 2022-08-08

## 2022-08-11 ENCOUNTER — ANTI-COAG VISIT (OUTPATIENT)
Dept: PHARMACY | Age: 62
End: 2022-08-11
Payer: COMMERCIAL

## 2022-08-11 DIAGNOSIS — I63.9 ACUTE CEREBROVASCULAR ACCIDENT (HCC): Primary | ICD-10-CM

## 2022-08-11 LAB — INTERNATIONAL NORMALIZATION RATIO, POC: 2.5

## 2022-08-11 PROCEDURE — 85610 PROTHROMBIN TIME: CPT

## 2022-08-11 PROCEDURE — 99212 OFFICE O/P EST SF 10 MIN: CPT

## 2022-08-11 NOTE — PROGRESS NOTES
alcohol use- drinks wine occasionally, doesn't smoke   []   [x]       Change in activity  []   [x]       Hospital admission  []   [x]       Emergency department visit  []   [x]       Other complaints    Clinical Outcomes:  Yes     No  []   [x]       Major bleeding event  []   [x]       Thromboembolic event    Duration of warfarin Therapy: indefinite  INR Range:  2.0-3.0    Pt has 5mg and 2.5mg tablets, takes at night. INR is 2.5 today after dose increase but did not do it- states did 7.5mg MWF and 5mg AOD so will continue this. Continue taking dose of 7.5 mg on Mon Wed and Fri and 5 mg all other days of the week. Discussed importance of consistency with vit k intake and ways to help ensure pt takes doses since consistently missing doses. Encouraged to maintain a consistency of vegetables/salads. Will need 2.5mg next time ([facility]).   Recheck INR in 3 weeks,  per request     Pt would prefer 3:30/later apts dt work schedule   **consent form signed 2021    Referring Dr. Rito Reis  INR (no units)   Date Value   2021 3.6 (H)   2021 2.7 (H)   2021 1.7 (H)   2021 1.2     INR,(POC) (no units)   Date Value   2022 2.5   2022 1.7   2022 1.6   2022 1.7     For Pharmacy Admin Tracking Only    Intervention Detail: Adherence Monitorin and Dose Adjustment: 1, reason: Therapy Optimization  Total # of Interventions Recommended: 1  Total # of Interventions Accepted: 1  Time Spent (min): 15

## 2022-09-07 ENCOUNTER — ANTI-COAG VISIT (OUTPATIENT)
Dept: PHARMACY | Age: 62
End: 2022-09-07
Payer: COMMERCIAL

## 2022-09-07 DIAGNOSIS — I63.9 ACUTE CEREBROVASCULAR ACCIDENT (HCC): Primary | ICD-10-CM

## 2022-09-07 LAB — INTERNATIONAL NORMALIZATION RATIO, POC: 2.2

## 2022-09-07 PROCEDURE — 85610 PROTHROMBIN TIME: CPT

## 2022-09-07 PROCEDURE — 99211 OFF/OP EST MAY X REQ PHY/QHP: CPT

## 2022-09-07 RX ORDER — AZATHIOPRINE 50 MG/1
TABLET ORAL
COMMUNITY
Start: 2022-08-26

## 2022-09-07 NOTE — PROGRESS NOTES
Mr. Norma Sahu is a 62 y.o. y/o male with history of  CVA dt thrombus  who presents today for anticoagulation monitoring and adjustment. 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 Was monitored at [facility] but dc dt non-compliance.       Patient Reported Findings:   Yes     No  [x]   []       Patient verifies current dosing regimen as listed- pt has 5mg and 2.5mg tablets and takes 7.5mg (one of each) on Thursdays only and 5mg all other days --> verified dose   []   [x]       S/S bleeding/bruising/swelling/SOB- denies  []   [x]       Blood in urine or stool- denies   []   [x]       Procedures scheduled in the future at this time- had wisdom teeth procedure on 10/27 and held 2 days before and day of procedure, then took 7.5mg on Thurs and Sat and today he thinks---> denies     []   [x]       Missed Dose -  might have missed dose Monday but unsure    []   [x]       Extra Dose - denies   []   [x]       Change in medications- takes pred 10mg daily, --> had injection of steroid for sarcoidosis, will be having injections twice a week of steroids will be on for likely 1 year --> switched from injection to azathioprine   []   [x]       Change in health/diet/appetite- pt cut them out of diet but occasionally eats broccoli --> had more green tea than normal --> had greens 3x/week and green tea --> no vit k in the past week, one green tea --> had greens 2-3 times in past week --> no greens ---> less greens, no NVD --> no changes    []   [x]       Change in alcohol use- drinks wine occasionally, doesn't smoke   []   [x]       Change in activity  []   [x]       Hospital admission  []   [x]       Emergency department visit  []   [x]       Other complaints    Clinical Outcomes:  Yes     No  []   [x]       Major bleeding event  []   [x]       Thromboembolic event    Duration of warfarin Therapy: indefinite  INR Range:  2.0-3.0    Pt has 5mg and 2.5mg tablets, takes at night.     INR is 2.2 today    Continue taking dose of 7.5 mg on Mon Wed and Fri and 5 mg all other days of the week. Discussed importance of consistency with vit k intake and ways to help ensure pt takes doses since consistently missing doses. Encouraged to maintain a consistency of vegetables/salads.    Refilled warfarin 2.5 mg tablets at next appt   Recheck INR in 4 weeks, 10/5     Pt would prefer 3:30/later apts dt work schedule     **consent form signed 11/1/2021    Referring Dr. Harry Crane  INR (no units)   Date Value   02/02/2021 3.6 (H)   01/27/2021 2.7 (H)   01/22/2021 1.7 (H)   01/19/2021 1.2     INR,(POC) (no units)   Date Value   08/11/2022 2.5   07/18/2022 1.7   06/30/2022 1.6   06/16/2022 1.7     For Pharmacy Admin Tracking Only    Intervention Detail: Refill(s) Provided  Total # of Interventions Recommended: 1  Total # of Interventions Accepted: 1  Time Spent (min): 15

## 2022-10-05 ENCOUNTER — ANTI-COAG VISIT (OUTPATIENT)
Dept: PHARMACY | Age: 62
End: 2022-10-05
Payer: COMMERCIAL

## 2022-10-05 DIAGNOSIS — I63.9 ACUTE CEREBROVASCULAR ACCIDENT (HCC): Primary | ICD-10-CM

## 2022-10-05 LAB — INTERNATIONAL NORMALIZATION RATIO, POC: 3

## 2022-10-05 PROCEDURE — 99211 OFF/OP EST MAY X REQ PHY/QHP: CPT

## 2022-10-05 PROCEDURE — 85610 PROTHROMBIN TIME: CPT

## 2022-10-05 NOTE — PROGRESS NOTES
Mr. Tu Eng is a 62 y.o. y/o male with history of  CVA dt thrombus  who presents today for anticoagulation monitoring and adjustment. 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 Was monitored at [facility] but dc dt non-compliance.       Patient Reported Findings:   Yes     No  [x]   []       Patient verifies current dosing regimen as listed- pt has 5mg and 2.5mg tablets and takes 7.5mg (one of each) on Thursdays only and 5mg all other days --> verified dose   []   [x]       S/S bleeding/bruising/swelling/SOB- denies  []   [x]       Blood in urine or stool- denies   []   [x]       Procedures scheduled in the future at this time- had wisdom teeth procedure on 10/27 and held 2 days before and day of procedure, then took 7.5mg on Thurs and Sat and today he thinks---> denies     []   [x]       Missed Dose -  denies  []   [x]       Extra Dose - denies   []   [x]       Change in medications- takes pred 10mg daily, --> had injection of steroid for sarcoidosis, will be having injections twice a week of steroids will be on for likely 1 year --> switched from injection to azathioprine --> no changes   [x]   []       Change in health/diet/appetite- pt cut them out of diet but occasionally eats broccoli --> had more green tea than normal --> had greens 3x/week and green tea --> no vit k in the past week, one green tea --> had greens 2-3 times in past week --> no greens ---> less greens, no NVD --> no changes  --> has had diarrhea recently   []   [x]       Change in alcohol use- drinks wine occasionally, doesn't smoke   []   [x]       Change in activity  []   [x]       Hospital admission  []   [x]       Emergency department visit  []   [x]       Other complaints    Clinical Outcomes:  Yes     No  []   [x]       Major bleeding event  []   [x]       Thromboembolic event    Duration of warfarin Therapy: indefinite  INR Range:  2.0-3.0    Pt has 5mg and 2.5mg tablets, takes at night. INR is 3 today    Continue taking dose of 7.5 mg on Mon Wed and Fri and 5 mg all other days of the week. Discussed importance of consistency with vit k intake and ways to help ensure pt takes doses since consistently missing doses. Encouraged to maintain a consistency of vegetables/salads.    Refilled warfarin 5 mg at opp   Recheck INR in 4 weeks, 11/2     Pt would prefer 3:30/later apts dt work schedule     **consent form signed 11/1/2021    Referring Dr. Davon Means  INR (no units)   Date Value   02/02/2021 3.6 (H)   01/27/2021 2.7 (H)   01/22/2021 1.7 (H)   01/19/2021 1.2     INR,(POC) (no units)   Date Value   09/07/2022 2.2   08/11/2022 2.5   07/18/2022 1.7   06/30/2022 1.6     For Pharmacy Admin Tracking Only    Intervention Detail: Refill(s) Provided  Total # of Interventions Recommended: 1  Total # of Interventions Accepted: 1  Time Spent (min): 15

## 2022-10-05 NOTE — TELEPHONE ENCOUNTER
Warfarin prescription phoned into [phone] to [phone] [facility] under Dr. Hector Regan  Warfarin 5 mg tabs  Take 7.5 mg on Mon, Wed and Fri and 5 mg all other days of the week  90 days   2 refills

## 2022-11-02 ENCOUNTER — ANTI-COAG VISIT (OUTPATIENT)
Dept: PHARMACY | Age: 62
End: 2022-11-02
Payer: COMMERCIAL

## 2022-11-02 DIAGNOSIS — I63.9 ACUTE CEREBROVASCULAR ACCIDENT (HCC): Primary | ICD-10-CM

## 2022-11-02 LAB — INTERNATIONAL NORMALIZATION RATIO, POC: 1.4

## 2022-11-02 PROCEDURE — 85610 PROTHROMBIN TIME: CPT

## 2022-11-02 PROCEDURE — 99212 OFFICE O/P EST SF 10 MIN: CPT

## 2022-11-02 NOTE — PROGRESS NOTES
Mr. Rohit Richard is a 62 y.o. y/o male with history of  CVA dt thrombus  who presents today for anticoagulation monitoring and adjustment. 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 Was monitored at [facility] but dc dt non-compliance. Patient Reported Findings:   Yes     No  [x]   []       Patient verifies current dosing regimen as listed- pt has 5mg and 2.5mg tablets and takes 7.5mg (one of each) on Thursdays only and 5mg all other days --> verified dose   []   [x]       S/S bleeding/bruising/swelling/SOB- denies  []   [x]       Blood in urine or stool- denies   []   [x]       Procedures scheduled in the future at this time- denies     []   [x]       Missed Dose -  denies  []   [x]       Extra Dose - denies   []   [x]       Change in medications- takes pred 10mg daily, --> had injection of steroid for sarcoidosis, will be having injections twice a week of steroids will be on for likely 1 year --> switched from injection to azathioprine --> no changes   [x]   []       Change in health/diet/appetite- pt cut them out of diet but occasionally eats broccoli --> had greens 3x/week and green tea --> no vit k in the past week, one green tea --> had greens 2-3 times in past week --> no greens ---> less greens, no NVD --> no changes  --> has had diarrhea recently --> had can of greens and green tea more than usual. Diarrhea improved   []   [x]       Change in alcohol use- drinks wine occasionally, doesn't smoke   []   [x]       Change in activity  []   [x]       Hospital admission  []   [x]       Emergency department visit  []   [x]       Other complaints    Clinical Outcomes:  Yes     No  []   [x]       Major bleeding event  []   [x]       Thromboembolic event    Duration of warfarin Therapy: indefinite  INR Range:  2.0-3.0    Pt has 5mg and 2.5mg tablets, takes at night.      INR is 1.4 today    Take 10 mg tonight and tomorrow then continue taking dose of 7.5 mg on Mon Wed and Fri and 5 mg all other days of the week. Discussed importance of consistency with vit k intake and ways to help ensure pt takes doses since consistently missing doses. Encouraged to maintain a consistency of vegetables/salads.    Recheck INR in 4 weeks, 11/17    Pt would prefer 3:30/later apts dt work schedule     **consent form signed 11/1/2021    Referring Dr. Hyacinth Dumont  INR (no units)   Date Value   02/02/2021 3.6 (H)   01/27/2021 2.7 (H)   01/22/2021 1.7 (H)   01/19/2021 1.2     INR,(POC) (no units)   Date Value   10/05/2022 3.0   09/07/2022 2.2   08/11/2022 2.5   07/18/2022 1.7     For Pharmacy Admin Tracking Only    Intervention Detail: Dose Adjustment: 1, reason: Therapy Optimization  Total # of Interventions Recommended: 1  Total # of Interventions Accepted: 1  Time Spent (min): 15

## 2022-11-17 ENCOUNTER — ANTI-COAG VISIT (OUTPATIENT)
Dept: PHARMACY | Age: 62
End: 2022-11-17
Payer: COMMERCIAL

## 2022-11-17 DIAGNOSIS — I63.9 ACUTE CEREBROVASCULAR ACCIDENT (HCC): Primary | ICD-10-CM

## 2022-11-17 LAB — INTERNATIONAL NORMALIZATION RATIO, POC: 2

## 2022-11-17 PROCEDURE — 99211 OFF/OP EST MAY X REQ PHY/QHP: CPT

## 2022-11-17 PROCEDURE — 85610 PROTHROMBIN TIME: CPT

## 2022-11-17 NOTE — PROGRESS NOTES
Mr. Tu Eng is a 62 y.o. y/o male with history of  CVA dt thrombus  who presents today for anticoagulation monitoring and adjustment. 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 Was monitored at [facility] but dc dt non-compliance. Patient Reported Findings:   Yes     No  [x]   []       Patient verifies current dosing regimen as listed- pt has 5mg and 2.5mg tablets and takes 7.5mg (one of each) on Thursdays only and 5mg all other days --> verified dose   []   [x]       S/S bleeding/bruising/swelling/SOB- denies  []   [x]       Blood in urine or stool- denies   []   [x]       Procedures scheduled in the future at this time- denies     []   [x]       Missed Dose -  denies  []   [x]       Extra Dose - denies   []   [x]       Change in medications- takes pred 10mg daily, --> had injection of steroid for sarcoidosis, will be having injections twice a week of steroids will be on for likely 1 year --> switched from injection to azathioprine --> no changes   [x]   []       Change in health/diet/appetite- pt cut them out of diet but occasionally eats broccoli --> had greens 3x/week and green tea --> no vit k in the past week, one green tea --> had greens 2-3 times in past week --> no greens ---> less greens, no NVD --> no changes  --> has had diarrhea recently --> had can of greens and green tea more than usual. Diarrhea improved ---> no changes, no NVD  []   [x]       Change in alcohol use- drinks wine occasionally, doesn't smoke   []   [x]       Change in activity  []   [x]       Hospital admission  []   [x]       Emergency department visit  []   [x]       Other complaints    Clinical Outcomes:  Yes     No  []   [x]       Major bleeding event  []   [x]       Thromboembolic event    Duration of warfarin Therapy: indefinite  INR Range:  2.0-3.0    Pt has 5mg and 2.5mg tablets, takes at night.      INR is 2.0 today    Continue taking dose of 7.5 mg on Mon Wed and Fri and 5 mg all other days of the week. Discussed importance of consistency with vit k intake and ways to help ensure pt takes doses since consistently missing doses. Encouraged to maintain a consistency of vegetables/salads. RF called into [facility] for 2.5mg tablets.    Recheck INR in 4 weeks, 12/15    Pt would prefer 3:30/later apts dt work schedule     **consent form signed 11/1/2021    Referring Dr. Leroy Cook  INR (no units)   Date Value   02/02/2021 3.6 (H)   01/27/2021 2.7 (H)   01/22/2021 1.7 (H)   01/19/2021 1.2     INR,(POC) (no units)   Date Value   11/17/2022 2.0   11/02/2022 1.4   10/05/2022 3.0   09/07/2022 2.2     For Pharmacy Admin Tracking Only    Intervention Detail: Refill(s) Provided  Total # of Interventions Recommended: 1  Total # of Interventions Accepted: 1  Time Spent (min): 15

## 2022-12-15 ENCOUNTER — ANTI-COAG VISIT (OUTPATIENT)
Dept: PHARMACY | Age: 62
End: 2022-12-15
Payer: COMMERCIAL

## 2022-12-15 DIAGNOSIS — I63.9 ACUTE CEREBROVASCULAR ACCIDENT (HCC): Primary | ICD-10-CM

## 2022-12-15 LAB — INTERNATIONAL NORMALIZATION RATIO, POC: 2.2

## 2022-12-15 PROCEDURE — 85610 PROTHROMBIN TIME: CPT

## 2022-12-15 PROCEDURE — 99211 OFF/OP EST MAY X REQ PHY/QHP: CPT

## 2022-12-15 NOTE — PROGRESS NOTES
Mr. Sheldon Perales is a 65 y.o. y/o male with history of  CVA dt thrombus  who presents today for anticoagulation monitoring and adjustment. 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 Was monitored at [facility] but dc dt non-compliance. Patient Reported Findings:   Yes     No  [x]   []       Patient verifies current dosing regimen as listed- pt has 5mg and 2.5mg tablets and takes 7.5mg (one of each) on Thursdays only and 5mg all other days --> verified dose---> cannot confirm    []   [x]       S/S bleeding/bruising/swelling/SOB- denies  []   [x]       Blood in urine or stool- denies   []   [x]       Procedures scheduled in the future at this time- denies     []   [x]       Missed Dose -  denies  []   [x]       Extra Dose - denies   []   [x]       Change in medications- takes pred 10mg daily, --> had injection of steroid for sarcoidosis, will be having injections twice a week of steroids will be on for likely 1 year --> switched from injection to azathioprine --> no changes, same pred dose   [x]   []       Change in health/diet/appetite- pt cut them out of diet but occasionally eats broccoli --> had greens 3x/week and green tea --> no vit k in the past week, one green tea --> had greens 2-3 times in past week --> no greens ---> less greens, no NVD --> no changes  --> has had diarrhea recently --> had can of greens and green tea more than usual. Diarrhea improved ---> no changes, no NVD  []   [x]       Change in alcohol use- drinks wine occasionally, doesn't smoke   []   [x]       Change in activity  []   [x]       Hospital admission  []   [x]       Emergency department visit  []   [x]       Other complaints    Clinical Outcomes:  Yes     No  []   [x]       Major bleeding event  []   [x]       Thromboembolic event    Duration of warfarin Therapy: indefinite  INR Range:  2.0-3.0    Pt has 5mg and 2.5mg tablets, takes at night.      INR is 2.2 today Continue taking dose of 7.5 mg on Mon Wed and Fri and 5 mg all other days of the week. Discussed importance of consistency with vit k intake and ways to help ensure pt takes doses since consistently missing doses. Encouraged to maintain a consistency of vegetables/salads.    Recheck INR in 4 weeks, 1/12    Pt would prefer 3:30/later apts dt work schedule     **consent form signed 11/1/2021    Referring Dr. Lord Ramírez  INR (no units)   Date Value   02/02/2021 3.6 (H)   01/27/2021 2.7 (H)   01/22/2021 1.7 (H)   01/19/2021 1.2     INR,(POC) (no units)   Date Value   12/15/2022 2.2   11/17/2022 2.0   11/02/2022 1.4   10/05/2022 3.0     For Pharmacy Admin Tracking Only    Total # of Interventions Recommended: 0  Total # of Interventions Accepted: 0  Time Spent (min): 15

## 2023-01-12 ENCOUNTER — ANTI-COAG VISIT (OUTPATIENT)
Dept: PHARMACY | Age: 63
End: 2023-01-12
Payer: COMMERCIAL

## 2023-01-12 DIAGNOSIS — I63.9 ACUTE CEREBROVASCULAR ACCIDENT (HCC): Primary | ICD-10-CM

## 2023-01-12 LAB — INTERNATIONAL NORMALIZATION RATIO, POC: 1.8

## 2023-01-12 PROCEDURE — 99211 OFF/OP EST MAY X REQ PHY/QHP: CPT

## 2023-01-12 PROCEDURE — 85610 PROTHROMBIN TIME: CPT

## 2023-01-12 NOTE — PROGRESS NOTES
Mr. Fredi Farmer is a 65 y.o. y/o male with history of  CVA dt thrombus  who presents today for anticoagulation monitoring and adjustment. 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 Was monitored at [facility] but dc dt non-compliance.       Patient Reported Findings:   Yes     No  [x]   []       Patient verifies current dosing regimen as listed- pt has 5mg and 2.5mg tablets and takes 7.5mg (one of each) on Thursdays only and 5mg all other days --> verified dose---> confirmed  []   [x]       S/S bleeding/bruising/swelling/SOB- denies  []   [x]       Blood in urine or stool- denies   []   [x]       Procedures scheduled in the future at this time- denies     []   [x]       Missed Dose -  denies  []   [x]       Extra Dose - denies   []   [x]       Change in medications- takes pred 10mg daily, --> had injection of steroid for sarcoidosis, will be having injections twice a week of steroids will be on for likely 1 year --> switched from injection to azathioprine --> no changes, same pred dose---> inc azathioprine dose about 2 weeks ago, started folate    [x]   []       Change in health/diet/appetite- pt cut them out of diet but occasionally eats broccoli --> had greens 3x/week and green tea --> no vit k in the past week, one green tea --> had greens 2-3 times in past week --> no greens ---> less greens, no NVD --> no changes  --> has had diarrhea recently --> had can of greens and green tea more than usual. Diarrhea improved ---> no changes, no NVD  []   [x]       Change in alcohol use- drinks wine occasionally, doesn't smoke   []   [x]       Change in activity  []   [x]       Hospital admission  []   [x]       Emergency department visit  []   [x]       Other complaints    Clinical Outcomes:  Yes     No  []   [x]       Major bleeding event  []   [x]       Thromboembolic event    Duration of warfarin Therapy: indefinite  INR Range:  2.0-3.0    Pt has 5mg and 2.5mg tablets, takes at night. INR is 1.8 today after increasing Azathioprine dose (can decrease INR). Increase dose to 7.5 mg on Mon, Wed, Fri, and Sat and 5 mg all other days of the week (5.9%). Discussed importance of consistency with vit k intake and ways to help ensure pt takes doses since consistently missing doses. Encouraged to maintain a consistency of vegetables/salads.    Recheck INR in 2 weeks, 1/26    Pt would prefer 3:30/later apts dt work schedule     Consent form signed 1/12/2023    Referring Dr. Esperanza Gonzáles  INR (no units)   Date Value   02/02/2021 3.6 (H)   01/27/2021 2.7 (H)   01/22/2021 1.7 (H)   01/19/2021 1.2     INR,(POC) (no units)   Date Value   01/12/2023 1.8   12/15/2022 2.2   11/17/2022 2.0   11/02/2022 1.4     For Pharmacy Admin Tracking Only    Intervention Detail: Dose Adjustment: 1, reason: Interaction  Total # of Interventions Recommended: 1  Total # of Interventions Accepted: 1  Time Spent (min): 15

## 2023-01-26 ENCOUNTER — ANTI-COAG VISIT (OUTPATIENT)
Dept: PHARMACY | Age: 63
End: 2023-01-26
Payer: COMMERCIAL

## 2023-01-26 DIAGNOSIS — I63.9 ACUTE CEREBROVASCULAR ACCIDENT (HCC): Primary | ICD-10-CM

## 2023-01-26 LAB — INTERNATIONAL NORMALIZATION RATIO, POC: 1.3

## 2023-01-26 PROCEDURE — 85610 PROTHROMBIN TIME: CPT

## 2023-01-26 PROCEDURE — 99212 OFFICE O/P EST SF 10 MIN: CPT

## 2023-01-26 NOTE — PROGRESS NOTES
Mr. Negin Martinez is a 65 y.o. y/o male with history of  CVA dt thrombus  who presents today for anticoagulation monitoring and adjustment. 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 Was monitored at [facility] but dc dt non-compliance.       Patient Reported Findings:   Yes     No  [x]   []       Patient verifies current dosing regimen as listed- pt has 5mg and 2.5mg tablets and takes 7.5mg (one of each) on Thursdays only and 5mg all other days --> verified dose---> confirmed  []   [x]       S/S bleeding/bruising/swelling/SOB- denies  []   [x]       Blood in urine or stool- denies   []   [x]       Procedures scheduled in the future at this time- denies     []   [x]       Missed Dose -  unsure   []   [x]       Extra Dose - denies   []   [x]       Change in medications- takes pred 10mg daily, --> had injection of steroid for sarcoidosis, will be having injections twice a week of steroids will be on for likely 1 year --> switched from injection to azathioprine --> no changes, same pred dose---> inc azathioprine dose about 2 weeks ago, started folate---> no changes     [x]   []       Change in health/diet/appetite- pt cut them out of diet but occasionally eats broccoli --> had greens 3x/week and green tea --> no vit k in the past week, one green tea --> had greens 2-3 times in past week --> no greens ---> less greens, no NVD --> no changes  --> has had diarrhea recently --> had can of greens and green tea more than usual. Diarrhea improved ---> no changes, no NVD---> more greens (four times in past week) but now out of them, had diarrhea on and off   []   [x]       Change in alcohol use- drinks wine occasionally, doesn't smoke   []   [x]       Change in activity  []   [x]       Hospital admission  []   [x]       Emergency department visit  []   [x]       Other complaints    Clinical Outcomes:  Yes     No  []   [x]       Major bleeding event  []   [x] Thromboembolic event    Duration of warfarin Therapy: indefinite  INR Range:  2.0-3.0    Pt has 5mg and 2.5mg tablets, takes at night.     INR is 1.3 today after increasing dose after increasing Azathioprine dose (can decrease INR). Pt states had more greens and could have missed a dose or 2.   Take 10mg tonight then continue on increase dose of 7.5 mg on Mon, Wed, Fri, and Sat and 5 mg all other days of the week.   Discussed importance of consistency with vit k intake and ways to help ensure pt takes doses since consistently missing doses.  Encouraged to maintain a consistency of vegetables/salads.   RF called into [facility] for 5mg and 2.5mg tablets.  Recheck INR in 10 days, 2/6    Pt would prefer 3:30/later apts dt work schedule     Consent form signed 2023    Referring Dr. Yu  INR (no units)   Date Value   2021 3.6 (H)   2021 2.7 (H)   2021 1.7 (H)   2021 1.2     INR,(POC) (no units)   Date Value   2023 1.3   2023 1.8   12/15/2022 2.2   2022 2.0     For Pharmacy Admin Tracking Only    Intervention Detail: Adherence Monitorin, Dose Adjustment: 1, reason: Therapy Optimization, and Refill(s) Provided  Total # of Interventions Recommended: 4  Total # of Interventions Accepted: 4  Time Spent (min): 15

## 2023-01-26 NOTE — TELEPHONE ENCOUNTER
Warfarin prescription phoned into [facility] to [address] [facility] under Dr. Gayathri Gage  Warfarin 2.5 mg tabs  Take 2.5mg x1 on Mon, Wed, Fri and Sat  90 days   2 refills    Elly Ovalle PharmD, [facility]    Total dose: 5 mg (5 mg x 1) every Sun, Tue, Thu; 7.5 mg (5 mg x 1 and 2.5 mg x 1) all other days.

## 2023-02-06 ENCOUNTER — TELEPHONE (OUTPATIENT)
Dept: PHARMACY | Age: 63
End: 2023-02-06

## 2023-02-07 ENCOUNTER — ANTI-COAG VISIT (OUTPATIENT)
Dept: PHARMACY | Age: 63
End: 2023-02-07
Payer: COMMERCIAL

## 2023-02-07 DIAGNOSIS — I63.9 ACUTE CEREBROVASCULAR ACCIDENT (HCC): Primary | ICD-10-CM

## 2023-02-07 LAB — INTERNATIONAL NORMALIZATION RATIO, POC: 1.4

## 2023-02-07 PROCEDURE — 99212 OFFICE O/P EST SF 10 MIN: CPT

## 2023-02-07 PROCEDURE — 85610 PROTHROMBIN TIME: CPT

## 2023-02-21 ENCOUNTER — TELEPHONE (OUTPATIENT)
Dept: PHARMACY | Age: 63
End: 2023-02-21

## 2023-02-21 ENCOUNTER — ANTI-COAG VISIT (OUTPATIENT)
Dept: PHARMACY | Age: 63
End: 2023-02-21
Payer: COMMERCIAL

## 2023-02-21 DIAGNOSIS — I63.9 ACUTE CEREBROVASCULAR ACCIDENT (HCC): Primary | ICD-10-CM

## 2023-02-21 LAB — INTERNATIONAL NORMALIZATION RATIO, POC: 1.2

## 2023-02-21 PROCEDURE — 85610 PROTHROMBIN TIME: CPT

## 2023-02-21 PROCEDURE — 99212 OFFICE O/P EST SF 10 MIN: CPT

## 2023-02-21 NOTE — PROGRESS NOTES
Mr. Abe Rowley is a 65 y.o. y/o male with history of  CVA dt thrombus  who presents today for anticoagulation monitoring and adjustment. 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 Was monitored at [facility] but dc dt non-compliance.       Patient Reported Findings:   Yes     No  [x]   []       Patient verifies current dosing regimen as listed- pt has 5mg and 2.5mg tablets and takes 7.5mg (one of each) on Thursdays only and 5mg all other days --> verified dose---> confirmed---> Thinks did 5mg Sun, Tues, Thurs and Sat (instead of 7.5mg on Sat) --> states followed AVS, unable to state   []   [x]       S/S bleeding/bruising/swelling/SOB- denies  []   [x]       Blood in urine or stool- denies   []   [x]       Procedures scheduled in the future at this time- denies     [x]   []       Missed Dose -  yesterday    []   [x]       Extra Dose - denies   []   [x]       Change in medications- takes pred 10mg daily, --> had injection of steroid for sarcoidosis, will be having injections twice a week of steroids will be on for likely 1 year --> switched from injection to azathioprine --> no changes, same pred dose---> inc azathioprine dose about 2 weeks ago, started folate---> no changes     [x]   []       Change in health/diet/appetite- pt cut them out of diet but occasionally eats broccoli --> had greens 3x/week and green tea --> no vit k in the past week, one green tea --> had greens 2-3 times in past week --> no greens ---> less greens, no NVD --> no changes  --> has had diarrhea recently --> had can of greens and green tea more than usual. Diarrhea improved ---> no changes, no NVD---> more greens (four times in past week) but now out of them, had diarrhea on and off --> no vit k   []   [x]       Change in alcohol use- drinks wine occasionally, doesn't smoke   []   [x]       Change in activity  []   [x]       Hospital admission  []   [x]       Emergency department visit  []   [x]       Other complaints    Clinical Outcomes:  Yes     No  []   [x]       Major bleeding event  []   [x]       Thromboembolic event    Duration of warfarin Therapy: indefinite  INR Range:  2.0-3.0    Pt has 5mg and 2.5mg tablets, takes at night. INR is 1.2 today  Pt continues to be significantly subtherapeutic despite continuing to increase dose. Pt continues to miss doses    Take 10 mg tonight then increase dose to 5mg Sun and 7.5mg all other days (5% inc)  Discussed importance of consistency with vit k intake and ways to help ensure pt takes doses since consistently missing doses. Encouraged to maintain a consistency of vegetables/salads.    Recheck INR in 1 week, 3/2  Pt frustrated with frequent apts, wishes to talk to referring about alternative options and does not wish to remain on warfarin     Pt would prefer 3:30/later apts dt work schedule     Consent form signed 1/12/2023    Referring Dr. Radha Calabrese  INR (no units)   Date Value   02/02/2021 3.6 (H)   01/27/2021 2.7 (H)   01/22/2021 1.7 (H)   01/19/2021 1.2     INR,(POC) (no units)   Date Value   02/07/2023 1.4   01/26/2023 1.3   01/12/2023 1.8   12/15/2022 2.2     For Pharmacy Admin Tracking Only    Intervention Detail: Dose Adjustment: 1, reason: Therapy Optimization  Total # of Interventions Recommended: 1  Total # of Interventions Accepted: 1  Time Spent (min): 15

## 2023-03-02 ENCOUNTER — ANTI-COAG VISIT (OUTPATIENT)
Dept: PHARMACY | Age: 63
End: 2023-03-02
Payer: COMMERCIAL

## 2023-03-02 DIAGNOSIS — I63.9 ACUTE CEREBROVASCULAR ACCIDENT (HCC): Primary | ICD-10-CM

## 2023-03-02 LAB — INTERNATIONAL NORMALIZATION RATIO, POC: 2.2

## 2023-03-02 PROCEDURE — 85610 PROTHROMBIN TIME: CPT

## 2023-03-02 PROCEDURE — 99212 OFFICE O/P EST SF 10 MIN: CPT

## 2023-03-02 NOTE — PROGRESS NOTES
Mr. Melody Munoz is a 65 y.o. y/o male with history of  CVA dt thrombus  who presents today for anticoagulation monitoring and adjustment. 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 Was monitored at [facility] but dc dt non-compliance.       Patient Reported Findings:   Yes     No  []   [x]       Patient verifies current dosing regimen as listed- pt has 5mg and 2.5mg tablets and takes 7.5mg (one of each) on Thursdays only and 5mg all other days --> verified dose---> confirmed---> Thinks did 5mg Sun, Tues, Thurs and Sat (instead of 7.5mg on Sat) --> states followed AVS, unable to state --> pt unable to state when or what dose took   []   [x]       S/S bleeding/bruising/swelling/SOB- denies  []   [x]       Blood in urine or stool- denies   []   [x]       Procedures scheduled in the future at this time- denies     []   [x]       Missed Dose -  denies    []   [x]       Extra Dose - denies   []   [x]       Change in medications- takes pred 10mg daily, --> had injection of steroid for sarcoidosis, will be having injections twice a week of steroids will be on for likely 1 year --> switched from injection to azathioprine --> no changes, same pred dose---> inc azathioprine dose about 2 weeks ago, started folate---> no changes     []   [x]       Change in health/diet/appetite- pt cut them out of diet but occasionally eats broccoli --> had greens 3x/week and green tea --> no vit k in the past week, one green tea --> had greens 2-3 times in past week --> no greens ---> less greens, no NVD --> no changes  --> has had diarrhea recently --> had can of greens and green tea more than usual. Diarrhea improved ---> no changes, no NVD---> more greens (four times in past week) but now out of them, had diarrhea on and off --> no vit k   []   [x]       Change in alcohol use- drinks wine occasionally, doesn't smoke   []   [x]       Change in activity  []   [x]       [facility] admission  []   [x]       Emergency department visit  []   [x]       Other complaints    Clinical Outcomes:  Yes     No  []   [x]       Major bleeding event  []   [x]       Thromboembolic event    Duration of warfarin Therapy: indefinite  INR Range:  2.0-3.0    Pt has 5mg and 2.5mg tablets, takes at night. INR is 2.2 today  Since believes he took 5 mg twice in past week and therapeutic today, will return to last dose   Return to dose of 5mg Sun and Thurs and 7.5mg all other days  Discussed importance of consistency with vit k intake and ways to help ensure pt takes doses since consistently missing doses. Encouraged to maintain a consistency of vegetables/salads.    Recheck INR in 2 weeks, 3/16     Pt would prefer later apts dt work schedule     Consent form signed 1/12/2023    Referring Dr. Gregg Madison  INR (no units)   Date Value   02/02/2021 3.6 (H)   01/27/2021 2.7 (H)   01/22/2021 1.7 (H)   01/19/2021 1.2     INR,(POC) (no units)   Date Value   02/21/2023 1.2   02/07/2023 1.4   01/26/2023 1.3   01/12/2023 1.8     For Pharmacy Admin Tracking Only    Intervention Detail: Dose Adjustment: 1, reason: Improve Adherence  Total # of Interventions Recommended: 1  Total # of Interventions Accepted: 1  Time Spent (min): 15

## 2023-03-15 ENCOUNTER — TELEPHONE (OUTPATIENT)
Dept: PHARMACY | Age: 63
End: 2023-03-15

## 2023-03-17 ENCOUNTER — TELEPHONE (OUTPATIENT)
Dept: PHARMACY | Age: 63
End: 2023-03-17

## 2023-03-21 ENCOUNTER — ANTI-COAG VISIT (OUTPATIENT)
Dept: PHARMACY | Age: 63
End: 2023-03-21
Payer: COMMERCIAL

## 2023-03-21 DIAGNOSIS — I63.9 ACUTE CEREBROVASCULAR ACCIDENT (HCC): Primary | ICD-10-CM

## 2023-03-21 LAB — INTERNATIONAL NORMALIZATION RATIO, POC: 3.1

## 2023-03-21 PROCEDURE — 85610 PROTHROMBIN TIME: CPT

## 2023-03-21 PROCEDURE — 99212 OFFICE O/P EST SF 10 MIN: CPT

## 2023-03-21 NOTE — PROGRESS NOTES
Mr. Percy Vazquez is a 65 y.o. y/o male with history of  CVA dt thrombus  who presents today for anticoagulation monitoring and adjustment. 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 Was monitored at [facility] but dc dt non-compliance. Patient Reported Findings:   Yes     No  []   [x]       Patient verifies current dosing regimen as listed- pt has 5mg and 2.5mg tablets and takes 7.5mg (one of each) on Thursdays only and 5mg all other days --> verified dose---> confirmed---> Thinks did 5mg Sun, Tues, Thurs and Sat (instead of 7.5mg on Sat) --> states followed AVS, unable to state --> pt unable to state when or what dose took ---> states took 7.5mg Mon-Fri and 5mg Sat and Sun  []   [x]       S/S bleeding/bruising/swelling/SOB- denies  []   [x]       Blood in urine or stool- denies   []   [x]       Procedures scheduled in the future at this time- denies     []   [x]       Missed Dose -  Pt thinks he could have missed a dose during the week- maybe Monday 2 weeks ago.    []   [x]       Extra Dose - denies   []   [x]       Change in medications- takes pred 10mg daily, --> had injection of steroid for sarcoidosis, will be having injections twice a week of steroids will be on for likely 1 year --> switched from injection to azathioprine --> no changes, same pred dose---> inc azathioprine dose about 2 weeks ago, started folate---> no changes     []   [x]       Change in health/diet/appetite- pt cut them out of diet but occasionally eats broccoli --> had greens 3x/week and green tea --> no vit k in the past week, one green tea --> had greens 2-3 times in past week --> no greens ---> less greens, no NVD --> no changes  --> has had diarrhea recently --> had can of greens and green tea more than usual. Diarrhea improved ---> no changes, no NVD---> more greens (four times in past week) but now out of them, had diarrhea on and off --> no vit k ---> more greens   []

## 2023-03-28 ENCOUNTER — ANTI-COAG VISIT (OUTPATIENT)
Dept: PHARMACY | Age: 63
End: 2023-03-28
Payer: COMMERCIAL

## 2023-03-28 DIAGNOSIS — I63.9 ACUTE CEREBROVASCULAR ACCIDENT (HCC): Primary | ICD-10-CM

## 2023-03-28 LAB — INTERNATIONAL NORMALIZATION RATIO, POC: 3.4

## 2023-03-28 PROCEDURE — 85610 PROTHROMBIN TIME: CPT

## 2023-03-28 PROCEDURE — 99212 OFFICE O/P EST SF 10 MIN: CPT

## 2023-03-28 NOTE — PROGRESS NOTES
Emergency department visit  []   [x]       Other complaints    Clinical Outcomes:  Yes     No  []   [x]       Major bleeding event  []   [x]       Thromboembolic event    Duration of warfarin Therapy: indefinite  INR Range:  2.0-3.0    Pt has 5mg and 2.5mg tablets, takes at night. INR is 3.4 today after removing vit k vegetables. Pt believes that he is taking too much warfarin and wants to lower dose. Discussed vit k intake and he plans to not be consistent so will lower weekly dose of warfarin   Take 5mg Sun Tues and Thurs and 7.5mg all other days (5% dec)  Discussed importance of consistency with vit k intake and ways to help ensure pt takes doses since consistently missing doses. Encouraged to maintain a consistency of vegetables/salads.    Recheck INR in 1 week, 4/4 per request   Pt would prefer later apts dt work schedule     Consent form signed 1/12/2023    Referring Dr. Bonnie Tovar  INR (no units)   Date Value   02/02/2021 3.6 (H)   01/27/2021 2.7 (H)   01/22/2021 1.7 (H)   01/19/2021 1.2     INR,(POC) (no units)   Date Value   03/21/2023 3.1   03/02/2023 2.2   02/21/2023 1.2   02/07/2023 1.4     For Pharmacy Admin Tracking Only    Intervention Detail: Dose Adjustment: 1, reason: Therapy Optimization  Total # of Interventions Recommended: 1  Total # of Interventions Accepted: 1  Time Spent (min): 15

## 2023-04-04 ENCOUNTER — ANTI-COAG VISIT (OUTPATIENT)
Dept: PHARMACY | Age: 63
End: 2023-04-04
Payer: COMMERCIAL

## 2023-04-04 DIAGNOSIS — I63.9 ACUTE CEREBROVASCULAR ACCIDENT (HCC): Primary | ICD-10-CM

## 2023-04-04 LAB — INTERNATIONAL NORMALIZATION RATIO, POC: 4.2

## 2023-04-04 PROCEDURE — 99212 OFFICE O/P EST SF 10 MIN: CPT

## 2023-04-04 PROCEDURE — 85610 PROTHROMBIN TIME: CPT

## 2023-04-04 NOTE — PROGRESS NOTES
use- drinks wine occasionally, doesn't smoke --> no changes   []   [x]       Change in activity  []   [x]       Hospital admission  []   [x]       Emergency department visit  []   [x]       Other complaints    Clinical Outcomes:  Yes     No  []   [x]       Major bleeding event  []   [x]       Thromboembolic event    Duration of warfarin Therapy: indefinite  INR Range:  2.0-3.0    Pt has 5mg and 2.5mg tablets, takes at night. INR is 3.4 today after lowering weekly dose but pt did not follow instructions. Reviewed again importance of adherence and discussed concerns for supratherapeutic INR despite lowering dose and missing dose. Pt unclear on what dose he received in last week   Pt anjel wants to take 7.5 mg twice in 1 week rather than 3 times. Will not fully assess weekly dose adjustment next visit but will have held dose. Reviewed dosing with pt multiple times to verify dosing   Hold dose tonight then decrease 7.5 mg on Wed and Fri and 5 mg all other days (11% dec)  Discussed importance of consistency with vit k intake and ways to help ensure pt takes doses since consistently missing doses. Encouraged to maintain a consistency of vegetables/salads.    Recheck INR in 1 week, 4/11 per request   Pt would prefer later apts dt work schedule     Consent form signed 1/12/2023    Referring Dr. Judith Lowry  INR (no units)   Date Value   02/02/2021 3.6 (H)   01/27/2021 2.7 (H)   01/22/2021 1.7 (H)   01/19/2021 1.2     INR,(POC) (no units)   Date Value   03/28/2023 3.4   03/21/2023 3.1   03/02/2023 2.2   02/21/2023 1.2     For Pharmacy Admin Tracking Only    Intervention Detail: Dose Adjustment: 1, reason: Therapy Optimization  Total # of Interventions Recommended: 1  Total # of Interventions Accepted: 1  Time Spent (min): 15

## 2023-04-25 ENCOUNTER — ANTI-COAG VISIT (OUTPATIENT)
Dept: PHARMACY | Age: 63
End: 2023-04-25
Payer: COMMERCIAL

## 2023-04-25 DIAGNOSIS — I63.9 ACUTE CEREBROVASCULAR ACCIDENT (HCC): Primary | ICD-10-CM

## 2023-04-25 LAB — INTERNATIONAL NORMALIZATION RATIO, POC: 1.5

## 2023-04-25 PROCEDURE — 99212 OFFICE O/P EST SF 10 MIN: CPT

## 2023-04-25 PROCEDURE — 85610 PROTHROMBIN TIME: CPT

## 2023-04-25 NOTE — PROGRESS NOTES
Mr. Fior Lincoln is a 65 y.o. y/o male with history of  CVA dt thrombus  who presents today for anticoagulation monitoring and adjustment. 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 Was monitored at [facility] but dc dt non-compliance.       Patient Reported Findings:   Yes     No  [x]   []       Patient verifies current dosing regimen as listed- pt has 5mg and 2.5mg tablets and takes 7.5mg (one of each) on Thursdays only and 5mg all other days --> pt unable to state when or what dose took ---> states took 7.5mg Mon-Fri and 5mg Sat and Sun --> verified dose --> slightly stated dose then after discussion pt believes he was alternating 7.5 mg and 5 mg not following paperwork---> states did 7.5mg twice but cannot confirm days, states it changes    []   [x]       S/S bleeding/bruising/swelling/SOB- denies  []   [x]       Blood in urine or stool- denies   []   [x]       Procedures scheduled in the future at this time- denies     [x]   []       Missed Dose -  Sun and Thurs    []   [x]       Extra Dose - denies   []   [x]       Change in medications- takes pred 10mg daily, --> had injection of steroid for sarcoidosis, will be having injections twice a week of steroids will be on for likely 1 year --> switched from injection to azathioprine --> no changes, same pred dose---> inc azathioprine dose about 2 weeks ago, started folate---> no changes     []   [x]       Change in health/diet/appetite- pt cut them out of diet but occasionally eats broccoli --> had greens 3x/week and green tea --> no vit k in the past week, one green tea --> had greens 2-3 times in past week --> no greens ---> less greens, no NVD --> no changes  --> has had diarrhea recently --> had can of greens and green tea more than usual. Diarrhea improved ---> no changes, no NVD---> more greens (four times in past week) but now out of them, had diarrhea on and off --> no vit k ---> more greens --> no

## 2023-05-10 ENCOUNTER — ANTI-COAG VISIT (OUTPATIENT)
Dept: PHARMACY | Age: 63
End: 2023-05-10
Payer: COMMERCIAL

## 2023-05-10 DIAGNOSIS — I63.9 ACUTE CEREBROVASCULAR ACCIDENT (HCC): Primary | ICD-10-CM

## 2023-05-10 LAB — INTERNATIONAL NORMALIZATION RATIO, POC: 2.4

## 2023-05-10 PROCEDURE — 85610 PROTHROMBIN TIME: CPT

## 2023-05-10 PROCEDURE — 99211 OFF/OP EST MAY X REQ PHY/QHP: CPT

## 2023-05-10 NOTE — PROGRESS NOTES
Mr. Julius Pearce is a 65 y.o. y/o male with history of  CVA dt thrombus  who presents today for anticoagulation monitoring and adjustment. 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 Was monitored at [facility] but dc dt non-compliance.       Patient Reported Findings:   Yes     No  [x]   []       Patient verifies current dosing regimen as listed- pt has 5mg and 2.5mg tablets and takes 7.5mg (one of each) on Thursdays only and 5mg all other days --> pt unable to state when or what dose took ---> states took 7.5mg Mon-Fri and 5mg Sat and Sun --> verified dose --> slightly stated dose then after discussion pt believes he was alternating 7.5 mg and 5 mg not following paperwork---> states did 7.5mg twice but cannot confirm days, states it changes  ---> confirmed   []   [x]       S/S bleeding/bruising/swelling/SOB- denies  []   [x]       Blood in urine or stool- denies   []   [x]       Procedures scheduled in the future at this time- denies     []   [x]       Missed Dose -  Sun and Thurs---> denies     []   [x]       Extra Dose - denies   []   [x]       Change in medications- takes pred 10mg daily, --> had injection of steroid for sarcoidosis, will be having injections twice a week of steroids will be on for likely 1 year --> switched from injection to azathioprine --> no changes, same pred dose---> inc azathioprine dose about 2 weeks ago, started folate---> no changes     []   [x]       Change in health/diet/appetite- pt cut them out of diet but occasionally eats broccoli --> had greens 3x/week and green tea --> no vit k in the past week, one green tea --> had greens 2-3 times in past week --> no greens ---> less greens, no NVD --> no changes  --> has had diarrhea recently --> had can of greens and green tea more than usual. Diarrhea improved ---> no changes, no NVD---> more greens (four times in past week) but now out of them, had diarrhea on and off --> no vit

## 2023-05-31 ENCOUNTER — ANTI-COAG VISIT (OUTPATIENT)
Dept: PHARMACY | Age: 63
End: 2023-05-31
Payer: COMMERCIAL

## 2023-05-31 DIAGNOSIS — I63.9 ACUTE CEREBROVASCULAR ACCIDENT (HCC): Primary | ICD-10-CM

## 2023-05-31 LAB — INTERNATIONAL NORMALIZATION RATIO, POC: 1.5

## 2023-05-31 PROCEDURE — 99212 OFFICE O/P EST SF 10 MIN: CPT

## 2023-05-31 PROCEDURE — 85610 PROTHROMBIN TIME: CPT

## 2023-05-31 NOTE — PROGRESS NOTES
changes, some diarrhea, no NV---> no greens, no tea, no NVD---> no changes, no NVD  []   [x]       Change in alcohol use- drinks wine occasionally, doesn't smoke --> no changes   []   [x]       Change in activity  []   [x]       Hospital admission  []   [x]       Emergency department visit  []   [x]       Other complaints    Clinical Outcomes:  Yes     No  []   [x]       Major bleeding event  []   [x]       Thromboembolic event    Duration of warfarin Therapy: indefinite  INR Range:  2.0-3.0    Pt has 5mg and 2.5mg tablets, takes at night. INR is 1.6 today after missed dose recently   Take 10 mg tonight then continue weekly dose of 7.5 mg on Wed and Fri and 5 mg all other days  Discussed importance of consistency with vit k intake and ways to help ensure pt takes doses since consistently missing doses.   Recheck INR in 3 week, 6/21  Pt would prefer later apts dt work schedule     Consent form signed 1/12/2023    Referring Dr. Kelly Hawk  INR (no units)   Date Value   02/02/2021 3.6 (H)   01/27/2021 2.7 (H)   01/22/2021 1.7 (H)   01/19/2021 1.2     INR,(POC) (no units)   Date Value   05/10/2023 2.4   04/25/2023 1.5   04/04/2023 4.2   03/28/2023 3.4     For Pharmacy Admin Tracking Only    Intervention Detail: Dose Adjustment: 1, reason: Therapy Optimization  Total # of Interventions Recommended: 1  Total # of Interventions Accepted: 1  Time Spent (min): 15

## 2023-06-20 ENCOUNTER — TELEPHONE (OUTPATIENT)
Dept: PHARMACY | Age: 63
End: 2023-06-20

## 2023-06-26 ENCOUNTER — ANTI-COAG VISIT (OUTPATIENT)
Dept: PHARMACY | Age: 63
End: 2023-06-26
Payer: COMMERCIAL

## 2023-06-26 DIAGNOSIS — I63.9 ACUTE CEREBROVASCULAR ACCIDENT (HCC): Primary | ICD-10-CM

## 2023-06-26 LAB — INTERNATIONAL NORMALIZATION RATIO, POC: 3.1

## 2023-06-26 PROCEDURE — 85610 PROTHROMBIN TIME: CPT

## 2023-06-26 PROCEDURE — 99211 OFF/OP EST MAY X REQ PHY/QHP: CPT

## 2023-07-17 ENCOUNTER — ANTI-COAG VISIT (OUTPATIENT)
Dept: PHARMACY | Age: 63
End: 2023-07-17
Payer: COMMERCIAL

## 2023-07-17 DIAGNOSIS — I63.9 ACUTE CEREBROVASCULAR ACCIDENT (HCC): Primary | ICD-10-CM

## 2023-07-17 LAB — INTERNATIONAL NORMALIZATION RATIO, POC: 2.8

## 2023-07-17 PROCEDURE — 85610 PROTHROMBIN TIME: CPT

## 2023-07-17 PROCEDURE — 99211 OFF/OP EST MAY X REQ PHY/QHP: CPT

## 2023-07-17 NOTE — PROGRESS NOTES
Mr. Mariela Hewitt is a 65 y.o. y/o male with history of  CVA dt thrombus  who presents today for anticoagulation monitoring and adjustment. 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 Was monitored at [facility] but dc dt non-compliance.       Patient Reported Findings:   Yes     No  [x]   []       Patient verifies current dosing regimen as listed- pt has 5mg and 2.5mg tablets and takes 7.5mg (one of each) on Thursdays only and 5mg all other days --> pt unable to state when or what dose took ---> states took 7.5mg Mon-Fri and 5mg Sat and Sun --> verified dose --> slightly stated dose then after discussion pt believes he was alternating 7.5 mg and 5 mg not following paperwork---> states did 7.5mg twice but cannot confirm days, states it changes  ---> cannot confirm   []   [x]       S/S bleeding/bruising/swelling/SOB- denies  []   [x]       Blood in urine or stool- denies   []   [x]       Procedures scheduled in the future at this time- denies     []   [x]       Missed Dose -  did miss a day in past week he thinks    []   [x]       Extra Dose - did take extra the next day   []   [x]       Change in medications- takes pred 10mg daily, --> had injection of steroid for sarcoidosis, will be having injections twice a week of steroids will be on for likely 1 year --> switched from injection to azathioprine --> no changes, same pred dose---> inc azathioprine dose about 2 weeks ago, started folate---> acthar injections twice weekly, has been consistently back on this for  3 mo (has CS in it so can affect INR, explained consistency is needed), when was off Acthar was on 15mg of pred then dropped down to 10mg of prednisone and backed down to 5mg  for past 1-2 mo while back on Acthar   []   [x]       Change in health/diet/appetite- pt cut them out of diet but occasionally eats broccoli --> had greens 3x/week and green tea --> no vit k in the past week, one green tea -->

## 2023-07-31 ENCOUNTER — ANTI-COAG VISIT (OUTPATIENT)
Dept: PHARMACY | Age: 63
End: 2023-07-31
Payer: COMMERCIAL

## 2023-07-31 DIAGNOSIS — I63.9 ACUTE CEREBROVASCULAR ACCIDENT (HCC): Primary | ICD-10-CM

## 2023-07-31 LAB — INTERNATIONAL NORMALIZATION RATIO, POC: 2.6

## 2023-07-31 PROCEDURE — 85610 PROTHROMBIN TIME: CPT

## 2023-07-31 PROCEDURE — 99211 OFF/OP EST MAY X REQ PHY/QHP: CPT

## 2023-07-31 NOTE — PROGRESS NOTES
improved ---> no changes, no NVD---> more greens (four times in past week) but now out of them, had diarrhea on and off --> no vit k --> no changes, some diarrhea, no NV---> no greens, no tea, no NVD---> no changes, no NVD---> green tea, diarrhea --> had greens last week   []   [x]       Change in alcohol use- drinks wine occasionally, doesn't smoke --> no changes   []   [x]       Change in activity  []   [x]       Hospital admission  []   [x]       Emergency department visit  []   [x]       Other complaints    Clinical Outcomes:  Yes     No  []   [x]       Major bleeding event  []   [x]       Thromboembolic event    Duration of warfarin Therapy: indefinite  INR Range:  2.0-3.0    Pt has 5mg and 2.5mg tablets, takes at night. INR is 2.6 today   Continue weekly dose of 7.5 mg on Tues and Fri and 5 mg all other days. Discussed importance of consistency with vit k intake and ways to help ensure pt takes doses since consistently missing doses.   Recheck INR in 3 weeks, 8/21  Pt would prefer later apts dt work schedule     Consent form signed 1/12/2023    Referring Dr. Isis Nunes  INR (no units)   Date Value   02/02/2021 3.6 (H)   01/27/2021 2.7 (H)   01/22/2021 1.7 (H)   01/19/2021 1.2     INR,(POC) (no units)   Date Value   07/17/2023 2.8   06/26/2023 3.1   05/31/2023 1.5   05/10/2023 2.4     For Pharmacy Admin Tracking Only    Total # of Interventions Recommended: 0  Total # of Interventions Accepted: 0  Time Spent (min): 15

## 2023-08-22 ENCOUNTER — TELEPHONE (OUTPATIENT)
Dept: PHARMACY | Age: 63
End: 2023-08-22

## 2023-08-23 ENCOUNTER — ANTI-COAG VISIT (OUTPATIENT)
Dept: PHARMACY | Age: 63
End: 2023-08-23
Payer: COMMERCIAL

## 2023-08-23 DIAGNOSIS — I63.9 ACUTE CEREBROVASCULAR ACCIDENT (HCC): Primary | ICD-10-CM

## 2023-08-23 LAB — INTERNATIONAL NORMALIZATION RATIO, POC: 1.1

## 2023-08-23 PROCEDURE — 99211 OFF/OP EST MAY X REQ PHY/QHP: CPT

## 2023-08-23 PROCEDURE — 85610 PROTHROMBIN TIME: CPT

## 2023-08-23 NOTE — PROGRESS NOTES
Mr. Benito Morel is a 65 y.o. y/o male with history of  CVA dt thrombus  who presents today for anticoagulation monitoring and adjustment. 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 Was monitored at [facility] but dc dt non-compliance.       Patient Reported Findings:   Yes     No  [x]   []       Patient verifies current dosing regimen as listed- pt has 5mg and 2.5mg tablets and takes 7.5mg (one of each) on Thursdays only and 5mg all other days --> pt unable to state when or what dose took ---> states took 7.5mg Mon-Fri and 5mg Sat and Sun --> verified dose --> slightly stated dose then after discussion pt believes he was alternating 7.5 mg and 5 mg not following paperwork---> states did 7.5mg twice but cannot confirm days, states it changes    []   [x]       S/S bleeding/bruising/swelling/SOB- denies  []   [x]       Blood in urine or stool- denies   []   [x]       Procedures scheduled in the future at this time- denies     [x]   []       Missed Dose - was on vacation last 2 weeks, hasn't been consistent, doesn't remember how many doses he missed  []   [x]       Extra Dose - denies   [x]   []       Change in medications- takes pred 10mg daily, --> had injection of steroid for sarcoidosis, will be having injections twice a week of steroids will be on for likely 1 year --> switched from injection to azathioprine --> acthar injections twice weekly, has been consistently back on this for  3 mo (has CS in it so can affect INR, explained consistency is needed), when was off Acthar was on 15mg of pred then dropped down to 10mg of prednisone and backed down to 5mg  for past 1-2 mo while back on Acthar --> still taking prednisone 5 mg daily --> haven't been consistent, doesn't know how many doses he has missed  [x]   []       Change in health/diet/appetite- pt cut them out of diet but occasionally eats broccoli --> had greens 3x/week and green tea --> no vit k in

## 2023-09-06 ENCOUNTER — TELEPHONE (OUTPATIENT)
Dept: PHARMACY | Age: 63
End: 2023-09-06

## 2023-09-12 ENCOUNTER — ANTI-COAG VISIT (OUTPATIENT)
Dept: PHARMACY | Age: 63
End: 2023-09-12
Payer: COMMERCIAL

## 2023-09-12 DIAGNOSIS — I63.9 ACUTE CEREBROVASCULAR ACCIDENT (HCC): Primary | ICD-10-CM

## 2023-09-12 LAB — INTERNATIONAL NORMALIZATION RATIO, POC: 2.3

## 2023-09-12 PROCEDURE — 85610 PROTHROMBIN TIME: CPT

## 2023-09-12 PROCEDURE — 99211 OFF/OP EST MAY X REQ PHY/QHP: CPT

## 2023-09-12 NOTE — PROGRESS NOTES
Mr. Reyes Moder is a 65 y.o. y/o male with history of  CVA dt thrombus  who presents today for anticoagulation monitoring and adjustment. 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 Was monitored at [facility] but dc dt non-compliance. Patient Reported Findings:   Yes     No  [x]   []       Patient verifies current dosing regimen as listed- pt has 5mg and 2.5mg tablets and takes 7.5mg (one of each) on Thursdays only and 5mg all other days --> pt unable to state when or what dose took ---> states did 7.5mg twice but cannot confirm days, states it changes    []   [x]       S/S bleeding/bruising/swelling/SOB- denies  []   [x]       Blood in urine or stool- denies   []   [x]       Procedures scheduled in the future at this time- denies     [x]   []       Missed Dose - was on vacation last 2 weeks, hasn't been consistent, doesn't remember how many doses he missed --> might have missed dose but unsure when.  Was within the last week   []   [x]       Extra Dose - denies   []   [x]       Change in medications- takes pred 10mg daily, --> had injection of steroid for sarcoidosis, will be having injections twice a week of steroids will be on for likely 1 year --> switched from injection to azathioprine --> acthar injections twice weekly, has been consistently back on this for  3 mo (has CS in it so can affect INR, explained consistency is needed), when was off Acthar was on 15mg of pred then dropped down to 10mg of prednisone and backed down to 5mg  for past 1-2 mo while back on Acthar --> still taking prednisone 5 mg daily --> no changes   [x]   []       Change in health/diet/appetite- pt cut them out of diet but occasionally eats broccoli --> had greens 3x/week and green tea --> no changes, some diarrhea, no NV---> no greens, no tea, no NVD---> no changes, no NVD---> green tea, diarrhea --> had greens last week--> not a lot of greens --> no changes   []   [x]

## 2023-10-03 ENCOUNTER — ANTI-COAG VISIT (OUTPATIENT)
Dept: PHARMACY | Age: 63
End: 2023-10-03
Payer: COMMERCIAL

## 2023-10-03 DIAGNOSIS — I63.9 ACUTE CEREBROVASCULAR ACCIDENT (HCC): Primary | ICD-10-CM

## 2023-10-03 LAB — INTERNATIONAL NORMALIZATION RATIO, POC: 1.9

## 2023-10-03 PROCEDURE — 99211 OFF/OP EST MAY X REQ PHY/QHP: CPT

## 2023-10-03 PROCEDURE — 85610 PROTHROMBIN TIME: CPT

## 2023-10-03 NOTE — PROGRESS NOTES
Mr. Whitney Kennedy is a 65 y.o. y/o male with history of  CVA dt thrombus  who presents today for anticoagulation monitoring and adjustment. 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 Was monitored at [facility] but dc dt non-compliance. Patient Reported Findings:   Yes     No  [x]   []       Patient verifies current dosing regimen as listed- pt has 5mg and 2.5mg tablets and takes 7.5mg (one of each) on Thursdays only and 5mg all other days --> pt unable to state when or what dose took ---> states did 7.5mg twice but cannot confirm days, states it changes    []   [x]       S/S bleeding/bruising/swelling/SOB- denies  []   [x]       Blood in urine or stool- denies   []   [x]       Procedures scheduled in the future at this time- denies     [x]   []       Missed Dose - was on vacation last 2 weeks, hasn't been consistent, doesn't remember how many doses he missed --> might have missed dose but unsure when.  Was within the last week   []   [x]       Extra Dose - denies   []   [x]       Change in medications- takes pred 10mg daily, --> had injection of steroid for sarcoidosis, will be having injections twice a week of steroids will be on for likely 1 year --> switched from injection to azathioprine --> acthar injections twice weekly, has been consistently back on this for  3 mo (has CS in it so can affect INR, explained consistency is needed), when was off Acthar was on 15mg of pred then dropped down to 10mg of prednisone and backed down to 5mg  for past 1-2 mo while back on Acthar --> still taking prednisone 5 mg daily --> no changes   [x]   []       Change in health/diet/appetite- pt cut them out of diet but occasionally eats broccoli --> had greens 3x/week and green tea --> no changes, some diarrhea, no NV---> no greens, no tea, no NVD---> no changes, no NVD---> green tea, diarrhea --> had greens last week--> not a lot of greens --> no changes   []   [x]

## 2023-10-03 NOTE — TELEPHONE ENCOUNTER
Warfarin prescription phoned into [address] to [facility] under Dr. Nigel Mackey  Warfarin 5 mg tabs  Take 5mg daily   90 days   2 refills    Joon BautistaD, [facility]    Total dose: 7.5 mg (5 mg x 1 and 2.5 mg x 1) every Tue, Fri; 5 mg (5 mg x 1) all other days.

## 2023-10-24 ENCOUNTER — ANTI-COAG VISIT (OUTPATIENT)
Dept: PHARMACY | Age: 63
End: 2023-10-24
Payer: COMMERCIAL

## 2023-10-24 DIAGNOSIS — I63.9 ACUTE CEREBROVASCULAR ACCIDENT (HCC): Primary | ICD-10-CM

## 2023-10-24 LAB — INTERNATIONAL NORMALIZATION RATIO, POC: 1.7

## 2023-10-24 PROCEDURE — 99211 OFF/OP EST MAY X REQ PHY/QHP: CPT

## 2023-10-24 PROCEDURE — 85610 PROTHROMBIN TIME: CPT

## 2023-10-24 NOTE — PROGRESS NOTES
NVD---> green tea, diarrhea --> had greens last week--> not a lot of greens --> no changes   []   [x]       Change in alcohol use- drinks wine occasionally, doesn't smoke --> no changes   []   [x]       Change in activity  []   [x]       Hospital admission  []   [x]       Emergency department visit  []   [x]       Other complaints    Clinical Outcomes:  Yes     No  []   [x]       Major bleeding event  []   [x]       Thromboembolic event    Duration of warfarin Therapy: indefinite  INR Range:  2.0-3.0    Pt has 5mg and 2.5mg tablets, takes at night. INR is 1.7 today after missed doses   Take 10mg today then continue weekly dose of 7.5 mg on Tues and Fri and 5 mg all other days  Discussed importance of consistency with vit k intake and ways to help ensure pt takes doses since consistently missing doses.   Recheck INR in 2 weeks,   Pt would prefer later apts dt work schedule     Consent form signed 2023    Referring Dr. Cathi Diaz  INR (no units)   Date Value   2021 3.6 (H)   2021 2.7 (H)   2021 1.7 (H)   2021 1.2     INR,(POC) (no units)   Date Value   10/03/2023 1.9   2023 2.3   2023 1.1   2023 2.6     For Pharmacy Admin Tracking Only    Intervention Detail: Adherence Monitorin and Dose Adjustment: 1, reason: Therapy Optimization  Total # of Interventions Recommended: 2  Total # of Interventions Accepted: 2  Time Spent (min): 15

## 2023-11-08 ENCOUNTER — ANTI-COAG VISIT (OUTPATIENT)
Dept: PHARMACY | Age: 63
End: 2023-11-08
Payer: COMMERCIAL

## 2023-11-08 DIAGNOSIS — I63.9 ACUTE CEREBROVASCULAR ACCIDENT (HCC): Primary | ICD-10-CM

## 2023-11-08 LAB — INTERNATIONAL NORMALIZATION RATIO, POC: 2.3

## 2023-11-08 PROCEDURE — 99211 OFF/OP EST MAY X REQ PHY/QHP: CPT

## 2023-11-08 PROCEDURE — 85610 PROTHROMBIN TIME: CPT

## 2023-11-08 NOTE — PROGRESS NOTES
Mr. Galina Carey is a 65 y.o. y/o male with history of  CVA dt thrombus  who presents today for anticoagulation monitoring and adjustment. 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 Was monitored at [facility] but dc dt non-compliance. Patient Reported Findings:   Yes     No  [x]   []       Patient verifies current dosing regimen as listed- pt has 5mg and 2.5mg tablets and takes 7.5mg (one of each) on Thursdays only and 5mg all other days --> pt unable to state when or what dose took ---> states did 7.5mg twice but cannot confirm days, states it changes ---> confirmed    []   [x]       S/S bleeding/bruising/swelling/SOB- denies  []   [x]       Blood in urine or stool- denies   []   [x]       Procedures scheduled in the future at this time- denies     [x]   []       Missed Dose - was on vacation last 2 weeks, hasn't been consistent, doesn't remember how many doses he missed --> might have missed dose but unsure when.  Was within the last week ---> missed 3-4 days between 10/6 and 10/16 he thinks but not sure about past week but thinks 1-2---> last Tues may have taken 5mg   []   [x]       Extra Dose - denies   []   [x]       Change in medications- takes pred 10mg daily, --> had injection of steroid for sarcoidosis, will be having injections twice a week of steroids will be on for likely 1 year --> switched from injection to azathioprine --> acthar injections twice weekly, has been consistently back on this for  3 mo (has CS in it so can affect INR, explained consistency is needed), when was off Acthar was on 15mg of pred then dropped down to 10mg of prednisone and backed down to 5mg  for past 1-2 mo while back on Acthar --> still taking prednisone 5 mg daily --> no changes   [x]   []       Change in health/diet/appetite- pt cut them out of diet but occasionally eats broccoli --> had greens 3x/week and green tea --> no changes, some diarrhea, no NV---> no
AFV, FHR, BPD Abnormalities

## 2023-12-07 ENCOUNTER — TELEPHONE (OUTPATIENT)
Dept: PHARMACY | Age: 63
End: 2023-12-07

## 2023-12-11 ENCOUNTER — APPOINTMENT (OUTPATIENT)
Dept: PHARMACY | Age: 63
End: 2023-12-11
Payer: COMMERCIAL

## 2023-12-11 ENCOUNTER — ANTI-COAG VISIT (OUTPATIENT)
Dept: PHARMACY | Age: 63
End: 2023-12-11
Payer: COMMERCIAL

## 2023-12-11 DIAGNOSIS — I63.9 ACUTE CEREBROVASCULAR ACCIDENT (HCC): Primary | ICD-10-CM

## 2023-12-11 LAB — INTERNATIONAL NORMALIZATION RATIO, POC: 1.9

## 2023-12-11 PROCEDURE — 85610 PROTHROMBIN TIME: CPT

## 2023-12-11 PROCEDURE — 99212 OFFICE O/P EST SF 10 MIN: CPT

## 2023-12-11 NOTE — PROGRESS NOTES
Mr. Julio Cesar Lopez is a 59 y.o. y/o male with history of  CVA dt thrombus  who presents today for anticoagulation monitoring and adjustment. 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 Was monitored at [facility] but dc dt non-compliance. Patient Reported Findings:   Yes     No  []   [x]       Patient verifies current dosing regimen as listed- pt has 5mg and 2.5mg tablets and takes 7.5mg (one of each) on Thursdays only and 5mg all other days --> pt unable to state when or what dose took ---> states did 7.5mg twice but cannot confirm days, states it changes ---> confirmed --> pt has been taking 5 mg daily self adjusted   []   [x]       S/S bleeding/bruising/swelling/SOB- denies  []   [x]       Blood in urine or stool- denies   []   [x]       Procedures scheduled in the future at this time- denies     [x]   []       Missed Dose - was on vacation last 2 weeks, hasn't been consistent, doesn't remember how many doses he missed --> might have missed dose but unsure when.  Was within the last week ---> missed 3-4 days between 10/6 and 10/16 he thinks but not sure about past week but thinks 1-2---> last Tues may have taken 5mg --> may have missed a dose but not in the past week   []   [x]       Extra Dose - denies   [x]   []       Change in medications- takes pred 10mg daily, --> acthar injections twice weekly, has been consistently back on this for  3 mo (has CS in it so can affect INR, explained consistency is needed), when was off Acthar was on 15mg of pred then dropped down to 10mg of prednisone and backed down to 5mg  for past 1-2 mo while back on Acthar --> still taking prednisone 5 mg daily --> inc prednisone 10 mg daily    [x]   []       Change in health/diet/appetite- pt cut them out of diet but occasionally eats broccoli --> had greens 3x/week and green tea --> no changes, some diarrhea, no NV---> no greens, no tea, no NVD---> no changes, no NVD--->

## 2023-12-26 ENCOUNTER — APPOINTMENT (OUTPATIENT)
Dept: PHARMACY | Age: 63
End: 2023-12-26
Payer: COMMERCIAL

## 2024-01-03 ENCOUNTER — ANTI-COAG VISIT (OUTPATIENT)
Dept: PHARMACY | Age: 64
End: 2024-01-03
Payer: COMMERCIAL

## 2024-01-03 DIAGNOSIS — I63.9 ACUTE CEREBROVASCULAR ACCIDENT (HCC): Primary | ICD-10-CM

## 2024-01-03 LAB — INTERNATIONAL NORMALIZATION RATIO, POC: 1.3

## 2024-01-03 PROCEDURE — 99211 OFF/OP EST MAY X REQ PHY/QHP: CPT

## 2024-01-03 PROCEDURE — 85610 PROTHROMBIN TIME: CPT

## 2024-01-03 NOTE — PROGRESS NOTES
Mr. Chuckie Granado is a 63 y.o. y/o male with history of  CVA dt thrombus  who presents today for anticoagulation monitoring and adjustment.    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 Was monitored at [facility] but dc dt non-compliance.      Patient Reported Findings:   Yes     No  []   [x]       Patient verifies current dosing regimen as listed- pt has 5mg and 2.5mg tablets and takes 7.5mg (one of each) on Thursdays only and 5mg all other days --> pt unable to state when or what dose took ---> states did 7.5mg twice but cannot confirm days, states it changes ---> confirmed --> pt has been taking 5 mg daily self adjusted--> states doing 5mg daily  []   [x]       S/S bleeding/bruising/swelling/SOB- denies  []   [x]       Blood in urine or stool- denies   []   [x]       Procedures scheduled in the future at this time- denies     [x]   []       Missed Dose - was on vacation last 2 weeks, hasn't been consistent, doesn't remember how many doses he missed --> might have missed dose but unsure when. Was within the last week ---> missed 3-4 days between 10/6 and 10/16 he thinks but not sure about past week but thinks 1-2---> last Tues may have taken 5mg --> may have missed a dose but not in the past week --> missed 2 doses in past week Fri and Sun  []   [x]       Extra Dose - denies   [x]   []       Change in medications- takes pred 10mg daily, --> acthar injections twice weekly, has been consistently back on this for  3 mo (has CS in it so can affect INR, explained consistency is needed), when was off Acthar was on 15mg of pred then dropped down to 10mg of prednisone and backed down to 5mg  for past 1-2 mo while back on Acthar --> still taking prednisone 5 mg daily --> inc prednisone 10 mg daily-->no changes   [x]   []       Change in health/diet/appetite- pt cut them out of diet but occasionally eats broccoli --> had greens 3x/week and green tea --> no changes,

## 2024-01-17 ENCOUNTER — ANTI-COAG VISIT (OUTPATIENT)
Dept: PHARMACY | Age: 64
End: 2024-01-17
Payer: COMMERCIAL

## 2024-01-17 DIAGNOSIS — I63.9 ACUTE CEREBROVASCULAR ACCIDENT (HCC): Primary | ICD-10-CM

## 2024-01-17 LAB — INTERNATIONAL NORMALIZATION RATIO, POC: 2.2

## 2024-01-17 PROCEDURE — 99212 OFFICE O/P EST SF 10 MIN: CPT

## 2024-01-17 PROCEDURE — 85610 PROTHROMBIN TIME: CPT

## 2024-01-17 NOTE — PROGRESS NOTES
Mr. Chuckie Granado is a 63 y.o. y/o male with history of  CVA dt thrombus  who presents today for anticoagulation monitoring and adjustment.    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 Was monitored at [facility] but dc dt non-compliance.      Patient Reported Findings:   Yes     No  []   [x]       Patient verifies current dosing regimen as listed- pt has 5mg and 2.5mg tablets and takes 7.5mg (one of each) on Thursdays only and 5mg all other days --> pt unable to state when or what dose took ---> states did 7.5mg twice but cannot confirm days, states it changes ---> confirmed --> pt has been taking 5 mg daily self adjusted--> states doing 5mg daily, did not do increase   []   [x]       S/S bleeding/bruising/swelling/SOB- denies  []   [x]       Blood in urine or stool- denies   []   [x]       Procedures scheduled in the future at this time- denies     [x]   []       Missed Dose - was on vacation last 2 weeks, hasn't been consistent, doesn't remember how many doses he missed --> might have missed dose but unsure when. Was within the last week ---> missed 3-4 days between 10/6 and 10/16 he thinks but not sure about past week but thinks 1-2---> last Tues may have taken 5mg --> may have missed a dose but not in the past week --> missed 2 doses in past week Fri and Sun---> denies   []   [x]       Extra Dose - denies   [x]   []       Change in medications- takes pred 10mg daily, --> acthar injections twice weekly, has been consistently back on this for  3 mo (has CS in it so can affect INR, explained consistency is needed), when was off Acthar was on 15mg of pred then dropped down to 10mg of prednisone and backed down to 5mg  for past 1-2 mo while back on Acthar --> still taking prednisone 5 mg daily --> inc prednisone 10 mg daily-->no changes   [x]   []       Change in health/diet/appetite- pt cut them out of diet but occasionally eats broccoli --> had greens

## 2024-01-31 ENCOUNTER — ANTI-COAG VISIT (OUTPATIENT)
Dept: PHARMACY | Age: 64
End: 2024-01-31
Payer: COMMERCIAL

## 2024-01-31 DIAGNOSIS — I63.9 ACUTE CEREBROVASCULAR ACCIDENT (HCC): Primary | ICD-10-CM

## 2024-01-31 LAB — INTERNATIONAL NORMALIZATION RATIO, POC: 2.4

## 2024-01-31 PROCEDURE — 99211 OFF/OP EST MAY X REQ PHY/QHP: CPT

## 2024-01-31 PROCEDURE — 85610 PROTHROMBIN TIME: CPT

## 2024-01-31 NOTE — PROGRESS NOTES
Mr. Chuckie Granado is a 63 y.o. y/o male with history of  CVA dt thrombus  who presents today for anticoagulation monitoring and adjustment.    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 Was monitored at [facility] but dc dt non-compliance.      Patient Reported Findings:   Yes     No  []   [x]       Patient verifies current dosing regimen as listed- pt has 5mg and 2.5mg tablets and takes 7.5mg (one of each) on Thursdays only and 5mg all other days --> pt unable to state when or what dose took ---> states did 7.5mg twice but cannot confirm days, states it changes ---> confirmed --> pt has been taking 5 mg daily self adjusted--> states doing 5mg daily, did not do increase ---> confirmed   []   [x]       S/S bleeding/bruising/swelling/SOB- denies  []   [x]       Blood in urine or stool- denies   []   [x]       Procedures scheduled in the future at this time- denies     [x]   []       Missed Dose - was on vacation last 2 weeks, hasn't been consistent, doesn't remember how many doses he missed --> might have missed dose but unsure when. Was within the last week ---> missed 3-4 days between 10/6 and 10/16 he thinks but not sure about past week but thinks 1-2---> last Tues may have taken 5mg --> may have missed a dose but not in the past week --> missed 2 doses in past week Fri and Sun---> denies   []   [x]       Extra Dose - denies   [x]   []       Change in medications- takes pred 10mg daily, --> acthar injections twice weekly, has been consistently back on this for  3 mo (has CS in it so can affect INR, explained consistency is needed), when was off Acthar was on 15mg of pred then dropped down to 10mg of prednisone and backed down to 5mg  for past 1-2 mo while back on Acthar --> still taking prednisone 5 mg daily --> inc prednisone 10 mg daily-->no changes   [x]   []       Change in health/diet/appetite- pt cut them out of diet but occasionally eats broccoli -->

## 2024-02-22 ENCOUNTER — TELEPHONE (OUTPATIENT)
Dept: PHARMACY | Age: 64
End: 2024-02-22

## 2024-02-26 ENCOUNTER — ANTI-COAG VISIT (OUTPATIENT)
Dept: PHARMACY | Age: 64
End: 2024-02-26
Payer: COMMERCIAL

## 2024-02-26 DIAGNOSIS — I63.9 ACUTE CEREBROVASCULAR ACCIDENT (HCC): Primary | ICD-10-CM

## 2024-02-26 LAB — INTERNATIONAL NORMALIZATION RATIO, POC: 2.2

## 2024-02-26 PROCEDURE — 99211 OFF/OP EST MAY X REQ PHY/QHP: CPT

## 2024-02-26 PROCEDURE — 85610 PROTHROMBIN TIME: CPT

## 2024-02-26 NOTE — PROGRESS NOTES
Mr. Chuckie Granado is a 63 y.o. y/o male with history of  CVA dt thrombus  who presents today for anticoagulation monitoring and adjustment.    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 Was monitored at [facility] but dc dt non-compliance.      Patient Reported Findings:   Yes     No  []   [x]       Patient verifies current dosing regimen as listed- pt has 5mg and 2.5mg tablets and takes 7.5mg (one of each) on Thursdays only and 5mg all other days --> pt unable to state when or what dose took ---> states did 7.5mg twice but cannot confirm days, states it changes ---> confirmed --> pt has been taking 5 mg daily self adjusted--> states doing 5mg daily, did not do increase ---> confirmed   []   [x]       S/S bleeding/bruising/swelling/SOB- denies  []   [x]       Blood in urine or stool- denies   []   [x]       Procedures scheduled in the future at this time- denies     [x]   []       Missed Dose - was on vacation last 2 weeks, hasn't been consistent, doesn't remember how many doses he missed --> might have missed dose but unsure when. Was within the last week ---> missed 3-4 days between 10/6 and 10/16 he thinks but not sure about past week but thinks 1-2---> last Tues may have taken 5mg --> may have missed a dose but not in the past week --> missed 2 doses in past week Fri and Sun---> denies   []   [x]       Extra Dose - denies   [x]   []       Change in medications- takes pred 10mg daily, --> acthar injections twice weekly, has been consistently back on this for  3 mo (has CS in it so can affect INR, explained consistency is needed), when was off Acthar was on 15mg of pred then dropped down to 10mg of prednisone and backed down to 5mg  for past 1-2 mo while back on Acthar --> still taking prednisone 5 mg daily --> inc prednisone 10 mg daily-->no changes   [x]   []       Change in health/diet/appetite- pt cut them out of diet but occasionally eats broccoli -->

## 2024-03-25 ENCOUNTER — ANTI-COAG VISIT (OUTPATIENT)
Dept: PHARMACY | Age: 64
End: 2024-03-25
Payer: COMMERCIAL

## 2024-03-25 DIAGNOSIS — I63.9 ACUTE CEREBROVASCULAR ACCIDENT (HCC): Primary | ICD-10-CM

## 2024-03-25 LAB — INTERNATIONAL NORMALIZATION RATIO, POC: 2.5

## 2024-03-25 PROCEDURE — 85610 PROTHROMBIN TIME: CPT

## 2024-03-25 PROCEDURE — 99211 OFF/OP EST MAY X REQ PHY/QHP: CPT

## 2024-03-25 NOTE — PROGRESS NOTES
Mr. Chuckie Granado is a 63 y.o. y/o male with history of  CVA dt thrombus  who presents today for anticoagulation monitoring and adjustment.    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 Was monitored at [facility] but dc dt non-compliance.      Patient Reported Findings:   Yes     No  []   [x]       Patient verifies current dosing regimen as listed- pt has 5mg and 2.5mg tablets and takes 7.5mg (one of each) on Thursdays only and 5mg all other days --> pt unable to state when or what dose took ---> states did 7.5mg twice but cannot confirm days, states it changes ---> confirmed --> pt has been taking 5 mg daily self adjusted--> states doing 5mg daily, did not do increase ---> confirmed   []   [x]       S/S bleeding/bruising/swelling/SOB- denies  []   [x]       Blood in urine or stool- denies   []   [x]       Procedures scheduled in the future at this time- denies     []   [x]       Missed Dose - denies   []   [x]       Extra Dose - denies   [x]   []       Change in medications- takes pred 10mg daily, --> acthar injections twice weekly, has been consistently back on this for  3 mo (has CS in it so can affect INR, explained consistency is needed), when was off Acthar was on 15mg of pred then dropped down to 10mg of prednisone and backed down to 5mg  for past 1-2 mo while back on Acthar --> still taking prednisone 5 mg daily --> inc prednisone 10 mg daily--> started xeljanz    []   [x]       Change in health/diet/appetite- pt cut them out of diet but occasionally eats broccoli --> had greens 3x/week and green tea --> no changes, some diarrhea, no NV---> no greens, no tea, no NVD---> no changes, no NVD---> green tea, diarrhea --> had greens last week--> not a lot of greens --> had greens 1-2 times in past week --> no vit k recently--> some greens last week---> greens 1-2 times---> no changes, no NVD  []   [x]       Change in alcohol use- drinks wine occasionally,

## 2024-04-22 ENCOUNTER — ANTI-COAG VISIT (OUTPATIENT)
Dept: PHARMACY | Age: 64
End: 2024-04-22
Payer: COMMERCIAL

## 2024-04-22 DIAGNOSIS — I63.9 ACUTE CEREBROVASCULAR ACCIDENT (HCC): Primary | ICD-10-CM

## 2024-04-22 LAB — INTERNATIONAL NORMALIZATION RATIO, POC: 1.1

## 2024-04-22 PROCEDURE — 85610 PROTHROMBIN TIME: CPT

## 2024-04-22 PROCEDURE — 99212 OFFICE O/P EST SF 10 MIN: CPT

## 2024-04-22 NOTE — PROGRESS NOTES
Mr. Chuckie Granado is a 63 y.o. y/o male with history of  CVA dt thrombus  who presents today for anticoagulation monitoring and adjustment.    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 Was monitored at [facility] but dc dt non-compliance.      Patient Reported Findings:   Yes     No  []   [x]       Patient verifies current dosing regimen as listed- pt has 5mg and 2.5mg tablets and takes 7.5mg (one of each) on Thursdays only and 5mg all other days --> pt unable to state when or what dose took ---> states did 7.5mg twice but cannot confirm days, states it changes ---> confirmed --> pt has been taking 5 mg daily self adjusted--> states doing 5mg daily, did not do increase ---> confirmed---> thinks did 2.5mg daily    []   [x]       S/S bleeding/bruising/swelling/SOB- denies  []   [x]       Blood in urine or stool- denies   []   [x]       Procedures scheduled in the future at this time- denies     []   [x]       Missed Dose - denies   []   [x]       Extra Dose - denies   [x]   []       Change in medications- takes pred 10mg daily, --> acthar injections twice weekly, has been consistently back on this for  3 mo (has CS in it so can affect INR, explained consistency is needed), when was off Acthar was on 15mg of pred then dropped down to 10mg of prednisone and backed down to 5mg  for past 1-2 mo while back on Acthar --> still taking prednisone 5 mg daily --> inc prednisone 10 mg daily--> started xeljanz ---> denies    []   [x]       Change in health/diet/appetite- pt cut them out of diet but occasionally eats broccoli --> had greens 3x/week and green tea --> no changes, some diarrhea, no NV---> no greens, no tea, no NVD---> no changes, no NVD---> green tea, diarrhea --> had greens last week--> not a lot of greens --> had greens 1-2 times in past week --> no vit k recently--> some greens last week---> greens 1-2 times---> no changes, no NVD  []   [x]       Change in

## 2024-04-30 ENCOUNTER — APPOINTMENT (OUTPATIENT)
Dept: PHARMACY | Age: 64
End: 2024-04-30
Payer: COMMERCIAL

## 2024-04-30 DIAGNOSIS — I63.9 ACUTE CEREBROVASCULAR ACCIDENT (HCC): Primary | ICD-10-CM

## 2024-04-30 LAB — INTERNATIONAL NORMALIZATION RATIO, POC: 1.6

## 2024-04-30 PROCEDURE — 99211 OFF/OP EST MAY X REQ PHY/QHP: CPT

## 2024-04-30 PROCEDURE — 85610 PROTHROMBIN TIME: CPT

## 2024-04-30 NOTE — PROGRESS NOTES
Mr. Chuckie Granado is a 63 y.o. y/o male with history of  CVA dt thrombus  who presents today for anticoagulation monitoring and adjustment.    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 Was monitored at [facility] but dc dt non-compliance.      Patient Reported Findings:   Yes     No  []   [x]       Patient verifies current dosing regimen as listed- pt has 5mg and 2.5mg tablets and takes 7.5mg (one of each) on Thursdays only and 5mg all other days --> pt unable to state when or what dose took ---> states did 7.5mg twice but cannot confirm days, states it changes ---> confirmed --> pt has been taking 5 mg daily self adjusted--> states doing 5mg daily, did not do increase ---> confirmed---> thinks did 2.5mg daily    []   [x]       S/S bleeding/bruising/swelling/SOB- denies  []   [x]       Blood in urine or stool- denies   []   [x]       Procedures scheduled in the future at this time- denies     []   [x]       Missed Dose - denies   []   [x]       Extra Dose - denies   [x]   []       Change in medications- takes pred 10mg daily, --> acthar injections twice weekly, has been consistently back on this for  3 mo (has CS in it so can affect INR, explained consistency is needed), when was off Acthar was on 15mg of pred then dropped down to 10mg of prednisone and backed down to 5mg  for past 1-2 mo while back on Acthar --> still taking prednisone 5 mg daily --> inc prednisone 10 mg daily--> started xeljanz ---> denies    []   [x]       Change in health/diet/appetite- pt cut them out of diet but occasionally eats broccoli --> had greens 3x/week and green tea --> no changes, some diarrhea, no NV---> no greens, no tea, no NVD---> no changes, no NVD---> green tea, diarrhea --> had greens last week--> not a lot of greens --> had greens 1-2 times in past week --> no vit k recently--> some greens last week---> greens 1-2 times---> no changes, no NVD  []   [x]       Change in

## 2024-05-07 ENCOUNTER — ANTI-COAG VISIT (OUTPATIENT)
Dept: PHARMACY | Age: 64
End: 2024-05-07
Payer: COMMERCIAL

## 2024-05-07 DIAGNOSIS — I63.9 ACUTE CEREBROVASCULAR ACCIDENT (HCC): Primary | ICD-10-CM

## 2024-05-07 LAB — INTERNATIONAL NORMALIZATION RATIO, POC: 2.2

## 2024-05-07 PROCEDURE — 85610 PROTHROMBIN TIME: CPT

## 2024-05-07 PROCEDURE — 99212 OFFICE O/P EST SF 10 MIN: CPT

## 2024-05-14 ENCOUNTER — ANTI-COAG VISIT (OUTPATIENT)
Dept: PHARMACY | Age: 64
End: 2024-05-14
Payer: COMMERCIAL

## 2024-05-14 DIAGNOSIS — I63.9 ACUTE CEREBROVASCULAR ACCIDENT (HCC): Primary | ICD-10-CM

## 2024-05-14 LAB — INTERNATIONAL NORMALIZATION RATIO, POC: 1.6

## 2024-05-14 PROCEDURE — 99212 OFFICE O/P EST SF 10 MIN: CPT

## 2024-05-14 PROCEDURE — 85610 PROTHROMBIN TIME: CPT

## 2024-05-14 NOTE — PROGRESS NOTES
Mr. Chuckie Granado is a 63 y.o. y/o male with history of  CVA dt thrombus  who presents today for anticoagulation monitoring and adjustment.    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 Was monitored at [facility] but dc dt non-compliance.      Patient Reported Findings:   Yes     No  []   [x]       Patient verifies current dosing regimen as listed- pt has 5mg and 2.5mg tablets and takes 7.5mg (one of each) on Thursdays only and 5mg all other days --> pt unable to state when or what dose took ---> states did 7.5mg twice but cannot confirm days, states it changes ---> confirmed --> pt has been taking 5 mg daily self adjusted--> states doing 5mg daily, did not do increase ---> confirmed---> thinks did 2.5mg daily  ---> confirmed   []   [x]       S/S bleeding/bruising/swelling/SOB- denies  []   [x]       Blood in urine or stool- denies   []   [x]       Procedures scheduled in the future at this time- denies     [x]   []       Missed Dose - denies ---> yesterday and maybe another day, unclear --> last fri   []   [x]       Extra Dose - denies   []   [x]       Change in medications- takes pred 10mg daily, --> acthar injections twice weekly, has been consistently back on this for  3 mo (has CS in it so can affect INR, explained consistency is needed), when was off Acthar was on 15mg of pred then dropped down to 10mg of prednisone and backed down to 5mg  for past 1-2 mo while back on Acthar --> still taking prednisone 5 mg daily --> inc prednisone 10 mg daily--> started xeljanz ---> denies    [x]   []       Change in health/diet/appetite- pt cut them out of diet but occasionally eats broccoli --> had greens 3x/week and green tea --> no changes, some diarrhea, no NV---> no greens, no tea, no NVD---> no changes, no NVD---> green tea, diarrhea --> had greens last week--> not a lot of greens --> had greens 1-2 times in past week --> no vit k recently--> some greens last

## 2024-05-28 ENCOUNTER — ANTI-COAG VISIT (OUTPATIENT)
Dept: PHARMACY | Age: 64
End: 2024-05-28
Payer: COMMERCIAL

## 2024-05-28 DIAGNOSIS — I63.9 ACUTE CEREBROVASCULAR ACCIDENT (HCC): Primary | ICD-10-CM

## 2024-05-28 LAB — INTERNATIONAL NORMALIZATION RATIO, POC: 1.3

## 2024-05-28 PROCEDURE — 99212 OFFICE O/P EST SF 10 MIN: CPT

## 2024-05-28 PROCEDURE — 85610 PROTHROMBIN TIME: CPT

## 2024-05-28 NOTE — PROGRESS NOTES
Mr. Chuckie Granado is a 63 y.o. y/o male with history of  CVA dt thrombus  who presents today for anticoagulation monitoring and adjustment.    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 Was monitored at [facility] but dc dt non-compliance.      Patient Reported Findings:   Yes     No  []   [x]       Patient verifies current dosing regimen as listed- pt has 5mg and 2.5mg tablets and takes 7.5mg (one of each) on Thursdays only and 5mg all other days --> pt unable to state when or what dose took ---> states did 7.5mg twice but cannot confirm days, states it changes ---> confirmed --> pt has been taking 5 mg daily self adjusted--> states doing 5mg daily, did not do increase ---> confirmed---> thinks did 2.5mg daily  ---> confirmed   []   [x]       S/S bleeding/bruising/swelling/SOB- denies  []   [x]       Blood in urine or stool- denies   []   [x]       Procedures scheduled in the future at this time- denies     [x]   []       Missed Dose - denies ---> yesterday and maybe another day, unclear --> last fri ---> missed at least 2 doses in past week dt running out  []   [x]       Extra Dose - denies   []   [x]       Change in medications- takes pred 10mg daily, --> acthar injections twice weekly, has been consistently back on this for  3 mo (has CS in it so can affect INR, explained consistency is needed), when was off Acthar was on 15mg of pred then dropped down to 10mg of prednisone and backed down to 5mg  for past 1-2 mo while back on Acthar --> still taking prednisone 5 mg daily --> inc prednisone 10 mg daily--> started xeljanz ---> denies    [x]   []       Change in health/diet/appetite- pt cut them out of diet but occasionally eats broccoli --> had greens 3x/week and green tea --> no changes, some diarrhea, no NV---> no greens, no tea, no NVD---> no changes, no NVD---> green tea, diarrhea --> had greens last week--> not a lot of greens --> had greens 1-2 times

## 2024-06-18 ENCOUNTER — TELEPHONE (OUTPATIENT)
Dept: PHARMACY | Age: 64
End: 2024-06-18

## 2024-06-20 ENCOUNTER — ANTI-COAG VISIT (OUTPATIENT)
Dept: PHARMACY | Age: 64
End: 2024-06-20
Payer: COMMERCIAL

## 2024-06-20 DIAGNOSIS — I63.9 ACUTE CEREBROVASCULAR ACCIDENT (HCC): Primary | ICD-10-CM

## 2024-06-20 LAB — INTERNATIONAL NORMALIZATION RATIO, POC: 1.3

## 2024-06-20 PROCEDURE — 85610 PROTHROMBIN TIME: CPT

## 2024-06-20 PROCEDURE — 99212 OFFICE O/P EST SF 10 MIN: CPT

## 2024-06-20 NOTE — PROGRESS NOTES
Mr. Chuckie Granado is a 63 y.o. y/o male with history of  CVA dt thrombus  who presents today for anticoagulation monitoring and adjustment.    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 Was monitored at [facility] but dc dt non-compliance.      Patient Reported Findings:   Yes     No  []   [x]       Patient verifies current dosing regimen as listed- pt has 5mg and 2.5mg tablets and takes 7.5mg (one of each) on Thursdays only and 5mg all other days --> pt unable to state when or what dose took ---> states did 7.5mg twice but cannot confirm days, states it changes ---> confirmed --> pt has been taking 5 mg daily self adjusted--> states doing 5mg daily, did not do increase ---> confirmed---> thinks did 2.5mg daily  ---> did not increase dose dose   []   [x]       S/S bleeding/bruising/swelling/SOB- denies  []   [x]       Blood in urine or stool- denies   []   [x]       Procedures scheduled in the future at this time- denies     [x]   []       Missed Dose - denies ---> yesterday and maybe another day, unclear --> last fri ---> missed at least 2 doses in past week dt running out --> again missed a few doses   []   [x]       Extra Dose - denies   [x]   []       Change in medications- takes pred 10mg daily, --> acthar injections twice weekly, has been consistently back on this for  3 mo (has CS in it so can affect INR, explained consistency is needed), when was off Acthar was on 15mg of pred then dropped down to 10mg of prednisone and backed down to 5mg  for past 1-2 mo while back on Acthar --> still taking prednisone 5 mg daily --> inc prednisone 10 mg daily--> started xeljanz ---> Per MD note 6/6: Decrease prednisone to 2.5 mg (1/2 of 5 mg tab) every day - do it daily for 1 month and then every other day for a month and then stop if doing okay   []   [x]       Change in health/diet/appetite- pt cut them out of diet but occasionally eats broccoli --> had greens

## 2024-07-09 ENCOUNTER — ANTI-COAG VISIT (OUTPATIENT)
Dept: PHARMACY | Age: 64
End: 2024-07-09
Payer: COMMERCIAL

## 2024-07-09 DIAGNOSIS — I63.9 ACUTE CEREBROVASCULAR ACCIDENT (HCC): Primary | ICD-10-CM

## 2024-07-09 LAB
INTERNATIONAL NORMALIZATION RATIO, POC: 1.3
PROTHROMBIN TIME, POC: NORMAL

## 2024-07-09 PROCEDURE — 99212 OFFICE O/P EST SF 10 MIN: CPT

## 2024-07-09 PROCEDURE — 85610 PROTHROMBIN TIME: CPT

## 2024-07-09 NOTE — PROGRESS NOTES
Mr. Chuckie Granado is a 63 y.o. y/o male with history of  CVA dt thrombus  who presents today for anticoagulation monitoring and adjustment.    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 Was monitored at [facility] but dc dt non-compliance.      Patient Reported Findings:   Yes     No  []   [x]       Patient verifies current dosing regimen as listed- pt has 5mg and 2.5mg tablets and takes 7.5mg (one of each) on Thursdays only and 5mg all other days --> pt unable to state when or what dose took ---> states did 7.5mg twice but cannot confirm days, states it changes ---> pt has been taking 5 mg daily self adjusted--> states doing 5mg daily, did not do increase ---> confirmed---> thinks did 2.5mg daily  ---> did not increase dose dose --> took 7.5 mg once since last visit, did not increase dose   []   [x]       S/S bleeding/bruising/swelling/SOB- denies  []   [x]       Blood in urine or stool- denies   []   [x]       Procedures scheduled in the future at this time- denies     [x]   []       Missed Dose - denies ---> yesterday and maybe another day, unclear --> last fri ---> missed at least 2 doses in past week dt running out --> again missed a few doses --> feels like he missed doses, unsure when. Some last week?  []   [x]       Extra Dose - denies   []   [x]       Change in medications- takes pred 10mg daily, --> acthar injections twice weekly, has been consistently back on this for  3 mo (has CS in it so can affect INR, explained consistency is needed), when was off Acthar was on 15mg of pred then dropped down to 10mg of prednisone and backed down to 5mg  for past 1-2 mo while back on Acthar --> still taking prednisone 5 mg daily --> inc prednisone 10 mg daily--> started xeljanz ---> Per MD note 6/6: Decrease prednisone to 2.5 mg (1/2 of 5 mg tab) every day - do it daily for 1 month and then every other day for a month and then stop if doing okay --> no changes

## 2024-07-16 ENCOUNTER — TELEPHONE (OUTPATIENT)
Dept: PHARMACY | Age: 64
End: 2024-07-16

## 2024-07-23 ENCOUNTER — ANTI-COAG VISIT (OUTPATIENT)
Dept: PHARMACY | Age: 64
End: 2024-07-23
Payer: COMMERCIAL

## 2024-07-23 DIAGNOSIS — I63.9 ACUTE CEREBROVASCULAR ACCIDENT (HCC): Primary | ICD-10-CM

## 2024-07-23 LAB
INTERNATIONAL NORMALIZATION RATIO, POC: 1.5
PROTHROMBIN TIME, POC: NORMAL

## 2024-07-23 PROCEDURE — 99212 OFFICE O/P EST SF 10 MIN: CPT

## 2024-07-23 PROCEDURE — 85610 PROTHROMBIN TIME: CPT

## 2024-07-23 NOTE — PROGRESS NOTES
Mr. Chuckie Granado is a 63 y.o. y/o male with history of  CVA dt thrombus  who presents today for anticoagulation monitoring and adjustment.    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 Was monitored at [facility] but dc dt non-compliance.      Patient Reported Findings:   Yes     No  []   [x]       Patient verifies current dosing regimen as listed- pt has 5mg and 2.5mg tablets and takes 7.5mg (one of each) on Thursdays only and 5mg all other days --> pt unable to state when or what dose took ---> states did 7.5mg twice but cannot confirm days, states it changes ---> pt has been taking 5 mg daily self adjusted--> states doing 5mg daily, did not do increase ---> confirmed---> thinks did 2.5mg daily  ---> did not increase dose dose --> took 7.5 mg once since last visit, did not increase dose---> states did 7.5mg Tues   []   [x]       S/S bleeding/bruising/swelling/SOB- denies  []   [x]       Blood in urine or stool- denies   []   [x]       Procedures scheduled in the future at this time- denies     [x]   []       Missed Dose - denies ---> yesterday and maybe another day, unclear --> last fri ---> missed at least 2 doses in past week dt running out --> again missed a few doses --> feels like he missed doses, unsure when. Some last week?---> missed Sat and yesterday   []   [x]       Extra Dose - denies   []   [x]       Change in medications- takes pred 10mg daily, --> acthar injections twice weekly, has been consistently back on this for  3 mo (has CS in it so can affect INR, explained consistency is needed), when was off Acthar was on 15mg of pred then dropped down to 10mg of prednisone and backed down to 5mg  for past 1-2 mo while back on Acthar --> still taking prednisone 5 mg daily --> inc prednisone 10 mg daily--> started xeljanz ---> Per MD note 6/6: Decrease prednisone to 2.5 mg (1/2 of 5 mg tab) every day - do it daily for 1 month and then every other day

## 2024-08-02 ENCOUNTER — TELEPHONE (OUTPATIENT)
Dept: PHARMACY | Age: 64
End: 2024-08-02

## 2024-08-13 ENCOUNTER — TELEPHONE (OUTPATIENT)
Dept: PHARMACY | Age: 64
End: 2024-08-13

## 2024-08-13 NOTE — TELEPHONE ENCOUNTER
Called and spoke to patient. Let him know that script was called in to [facility]. When resumes warfarin taking 10 mg x 2 days then return to weekly dose of 7.5 mg on Tues and Sat and 5 mg all other days of the week. Will recheck INR on 8/20

## 2024-08-13 NOTE — TELEPHONE ENCOUNTER
Warfarin prescription phoned in and LM on VM to [facility] [phone] under Dr. Rocael Christina ([phone])  Warfarin 5 mg tabs  Take 7.5 mg on Tues and Sat and 5 mg all other days of the week  90 days   2 refills

## 2024-08-13 NOTE — TELEPHONE ENCOUNTER
----- Message from Marii SESAY sent at 8/13/2024 12:00 PM EDT -----  Regarding: Needs warfarin refill  Contact: patient  Patient called to schedule an appt however, he has been out of warfarin for \"about a week and a half.\". Patient would like a refill called into [facility], [address] [place] [address]   [phone].  Please call patient with warfarin dosing re-start instructions, [phone].  Patient has scheduled an appt to return to clinic on 8/20.

## 2024-08-20 ENCOUNTER — APPOINTMENT (OUTPATIENT)
Dept: PHARMACY | Age: 64
End: 2024-08-20
Payer: COMMERCIAL

## 2024-08-21 ENCOUNTER — ANTI-COAG VISIT (OUTPATIENT)
Dept: PHARMACY | Age: 64
End: 2024-08-21
Payer: COMMERCIAL

## 2024-08-21 DIAGNOSIS — I63.9 ACUTE CEREBROVASCULAR ACCIDENT (HCC): Primary | ICD-10-CM

## 2024-08-21 LAB
INTERNATIONAL NORMALIZATION RATIO, POC: 1.1
PROTHROMBIN TIME, POC: 0

## 2024-08-21 PROCEDURE — 99212 OFFICE O/P EST SF 10 MIN: CPT

## 2024-08-21 PROCEDURE — 85610 PROTHROMBIN TIME: CPT

## 2024-08-21 NOTE — PROGRESS NOTES
Mr. Chuckie Granado is a 63 y.o. y/o male with history of  CVA dt thrombus  who presents today for anticoagulation monitoring and adjustment.    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 Was monitored at [facility] but dc dt non-compliance.      Patient Reported Findings:   Yes     No  []   [x]       Patient verifies current dosing regimen as listed- pt has 5mg and 2.5mg tablets and takes 7.5mg (one of each) on Thursdays only and 5mg all other days --> pt unable to state when or what dose took ---> states did 7.5mg twice but cannot confirm days, states it changes ---> pt has been taking 5 mg daily self adjusted--> states doing 5mg daily, did not do increase ---> confirmed---> thinks did 2.5mg daily  ---> did not increase dose dose --> took 7.5 mg once since last visit, did not increase dose---> states did 7.5mg Tues ---> no warfarin for past 3 weeks   []   [x]       S/S bleeding/bruising/swelling/SOB- denies  []   [x]       Blood in urine or stool- denies   []   [x]       Procedures scheduled in the future at this time- denies     [x]   []       Missed Dose - denies ---> yesterday and maybe another day, unclear --> last fri ---> missed at least 2 doses in past week dt running out --> again missed a few doses --> feels like he missed doses, unsure when. Some last week?---> missed Sat and yesterday ---> no warfarin for past 2-3 weeks per pt  []   [x]       Extra Dose - denies   []   [x]       Change in medications- takes pred 10mg daily, --> acthar injections twice weekly, has been consistently back on this for  3 mo (has CS in it so can affect INR, explained consistency is needed), when was off Acthar was on 15mg of pred then dropped down to 10mg of prednisone and backed down to 5mg  for past 1-2 mo while back on Acthar --> still taking prednisone 5 mg daily --> inc prednisone 10 mg daily--> started xeljanz ---> Per MD note 6/6: Decrease prednisone to 2.5 mg

## 2024-08-29 ENCOUNTER — APPOINTMENT (OUTPATIENT)
Dept: PHARMACY | Age: 64
End: 2024-08-29
Payer: COMMERCIAL

## 2024-09-05 ENCOUNTER — TELEPHONE (OUTPATIENT)
Dept: PHARMACY | Age: 64
End: 2024-09-05

## 2024-09-09 ENCOUNTER — ANTI-COAG VISIT (OUTPATIENT)
Dept: PHARMACY | Age: 64
End: 2024-09-09
Payer: COMMERCIAL

## 2024-09-09 DIAGNOSIS — I63.9 ACUTE CEREBROVASCULAR ACCIDENT (HCC): Primary | ICD-10-CM

## 2024-09-09 LAB
INTERNATIONAL NORMALIZATION RATIO, POC: 2
PROTHROMBIN TIME, POC: 0

## 2024-09-09 PROCEDURE — 99211 OFF/OP EST MAY X REQ PHY/QHP: CPT

## 2024-09-09 PROCEDURE — 85610 PROTHROMBIN TIME: CPT

## 2024-09-25 ENCOUNTER — ANTI-COAG VISIT (OUTPATIENT)
Dept: PHARMACY | Age: 64
End: 2024-09-25
Payer: COMMERCIAL

## 2024-09-25 DIAGNOSIS — I63.9 ACUTE CEREBROVASCULAR ACCIDENT (HCC): Primary | ICD-10-CM

## 2024-09-25 LAB
INTERNATIONAL NORMALIZATION RATIO, POC: 2.3
PROTHROMBIN TIME, POC: 0

## 2024-09-25 PROCEDURE — 85610 PROTHROMBIN TIME: CPT

## 2024-09-25 PROCEDURE — 99211 OFF/OP EST MAY X REQ PHY/QHP: CPT

## 2024-10-11 ENCOUNTER — ANTI-COAG VISIT (OUTPATIENT)
Dept: PHARMACY | Age: 64
End: 2024-10-11
Payer: COMMERCIAL

## 2024-10-11 DIAGNOSIS — I63.9 ACUTE CEREBROVASCULAR ACCIDENT (HCC): Primary | ICD-10-CM

## 2024-10-11 LAB
INTERNATIONAL NORMALIZATION RATIO, POC: 3.7
PROTHROMBIN TIME, POC: 0

## 2024-10-11 PROCEDURE — 85610 PROTHROMBIN TIME: CPT | Performed by: PHYSICIAN ASSISTANT

## 2024-10-11 PROCEDURE — 99212 OFFICE O/P EST SF 10 MIN: CPT | Performed by: PHYSICIAN ASSISTANT

## 2024-10-11 NOTE — PROGRESS NOTES
Tracking Only    Intervention Detail: Dose Adjustment: 1, reason: Therapy Optimization  Total # of Interventions Recommended: 1  Total # of Interventions Accepted: 1  Time Spent (min): 15

## 2024-10-23 ENCOUNTER — ANTI-COAG VISIT (OUTPATIENT)
Dept: PHARMACY | Age: 64
End: 2024-10-23
Payer: COMMERCIAL

## 2024-10-23 DIAGNOSIS — I63.9 ACUTE CEREBROVASCULAR ACCIDENT (HCC): Primary | ICD-10-CM

## 2024-10-23 LAB
INTERNATIONAL NORMALIZATION RATIO, POC: 3
PROTHROMBIN TIME, POC: 0

## 2024-10-23 PROCEDURE — 99212 OFFICE O/P EST SF 10 MIN: CPT

## 2024-10-23 PROCEDURE — 85610 PROTHROMBIN TIME: CPT

## 2024-10-23 NOTE — PROGRESS NOTES
Mr. Chuckie Granado is a 63 y.o. y/o male with history of  CVA dt thrombus  who presents today for anticoagulation monitoring and adjustment.    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 Was monitored at [facility] but dc dt non-compliance.      Patient Reported Findings:   Yes     No  []   [x]       Patient verifies current dosing regimen as listed- pt has 5mg and 2.5mg tablets and takes 7.5mg (one of each) on Thursdays only and 5mg all other days --> pt unable to state when or what dose took ---> states did 7.5mg twice but cannot confirm days, states it changes ---> pt has been taking 5 mg daily self adjusted--> states doing 5mg daily, did not do increase ---> confirmed---> thinks did 2.5mg daily  ---> did not increase dose dose --> took 7.5 mg once since last visit, did not increase dose---> states did 7.5mg Tues ---> no warfarin for past 3 weeks --> confirmed--> pt could not confirm which days he takes 7.5 mg, when asked to repeat dose adjustment at end of appt he was about to confirm correctly --> did not lower dose as instructed--> patient \"thinks\" he takes 5 mg daily  []   [x]       S/S bleeding/bruising/swelling/SOB- denies  []   [x]       Blood in urine or stool- denies   []   [x]       Procedures scheduled in the future at this time- denies     []   [x]       Missed Dose - denies-->missed for sure 10/21  []   [x]       Extra Dose - denies--> Thinks he forgot to decrease to 5 mg last week at but unsure again  []   [x]       Change in medications- takes pred 10mg daily, --> acthar injections twice weekly, has been consistently back on this for  3 mo (has CS in it so can affect INR, explained consistency is needed), when was off Acthar was on 15mg of pred then dropped down to 10mg of prednisone and backed down to 5mg  for past 1-2 mo while back on Acthar --> still taking prednisone 5 mg daily --> inc prednisone 10 mg daily--> started xeljanz ---> Per MD

## 2024-11-08 ENCOUNTER — TELEPHONE (OUTPATIENT)
Dept: PHARMACY | Age: 64
End: 2024-11-08

## 2024-11-08 ENCOUNTER — ANTI-COAG VISIT (OUTPATIENT)
Dept: PHARMACY | Age: 64
End: 2024-11-08

## 2024-11-08 PROBLEM — I63.9 ACUTE CEREBROVASCULAR ACCIDENT (HCC): Status: RESOLVED | Noted: 2021-11-01 | Resolved: 2024-11-08
